# Patient Record
Sex: MALE | Race: WHITE | NOT HISPANIC OR LATINO | Employment: FULL TIME | URBAN - METROPOLITAN AREA
[De-identification: names, ages, dates, MRNs, and addresses within clinical notes are randomized per-mention and may not be internally consistent; named-entity substitution may affect disease eponyms.]

---

## 2017-03-25 ENCOUNTER — COMMUNICATION - HEALTHEAST (OUTPATIENT)
Dept: FAMILY MEDICINE | Facility: CLINIC | Age: 32
End: 2017-03-25

## 2017-03-25 DIAGNOSIS — F41.1 ANXIETY STATE: ICD-10-CM

## 2017-03-27 ENCOUNTER — COMMUNICATION - HEALTHEAST (OUTPATIENT)
Dept: FAMILY MEDICINE | Facility: CLINIC | Age: 32
End: 2017-03-27

## 2017-03-27 DIAGNOSIS — F41.1 ANXIETY STATE: ICD-10-CM

## 2017-04-22 ENCOUNTER — COMMUNICATION - HEALTHEAST (OUTPATIENT)
Dept: FAMILY MEDICINE | Facility: CLINIC | Age: 32
End: 2017-04-22

## 2017-04-22 DIAGNOSIS — F41.1 ANXIETY STATE: ICD-10-CM

## 2017-08-16 ENCOUNTER — OFFICE VISIT - HEALTHEAST (OUTPATIENT)
Dept: FAMILY MEDICINE | Facility: CLINIC | Age: 32
End: 2017-08-16

## 2017-08-16 DIAGNOSIS — F41.1 ANXIETY STATE: ICD-10-CM

## 2017-08-16 DIAGNOSIS — Z91.030 BEE STING ALLERGY: ICD-10-CM

## 2017-08-16 DIAGNOSIS — S76.302A LEFT HAMSTRING INJURY: ICD-10-CM

## 2017-08-16 ASSESSMENT — MIFFLIN-ST. JEOR: SCORE: 1718.5

## 2017-12-27 ENCOUNTER — COMMUNICATION - HEALTHEAST (OUTPATIENT)
Dept: FAMILY MEDICINE | Facility: CLINIC | Age: 32
End: 2017-12-27

## 2017-12-27 DIAGNOSIS — F41.1 ANXIETY STATE: ICD-10-CM

## 2018-01-11 ENCOUNTER — OFFICE VISIT - HEALTHEAST (OUTPATIENT)
Dept: FAMILY MEDICINE | Facility: CLINIC | Age: 33
End: 2018-01-11

## 2018-01-11 DIAGNOSIS — F41.1 ANXIETY STATE: ICD-10-CM

## 2018-01-11 DIAGNOSIS — L74.519 EXCESSIVE SWEATING, LOCAL: ICD-10-CM

## 2018-01-11 DIAGNOSIS — Z00.00 ROUTINE GENERAL MEDICAL EXAMINATION AT A HEALTH CARE FACILITY: ICD-10-CM

## 2018-01-11 DIAGNOSIS — E78.5 HYPERLIPIDEMIA: ICD-10-CM

## 2018-01-11 LAB
CHOLEST SERPL-MCNC: 212 MG/DL
FASTING STATUS PATIENT QL REPORTED: YES
FASTING STATUS PATIENT QL REPORTED: YES
GLUCOSE BLD-MCNC: 81 MG/DL (ref 70–99)
HDLC SERPL-MCNC: 39 MG/DL
LDLC SERPL CALC-MCNC: 153 MG/DL
TRIGL SERPL-MCNC: 100 MG/DL

## 2018-01-11 ASSESSMENT — MIFFLIN-ST. JEOR: SCORE: 1704.89

## 2018-03-01 ENCOUNTER — COMMUNICATION - HEALTHEAST (OUTPATIENT)
Dept: FAMILY MEDICINE | Facility: CLINIC | Age: 33
End: 2018-03-01

## 2018-03-01 DIAGNOSIS — R04.0 EPISTAXIS: ICD-10-CM

## 2018-03-26 ENCOUNTER — OFFICE VISIT - HEALTHEAST (OUTPATIENT)
Dept: FAMILY MEDICINE | Facility: CLINIC | Age: 33
End: 2018-03-26

## 2018-03-26 ENCOUNTER — COMMUNICATION - HEALTHEAST (OUTPATIENT)
Dept: SCHEDULING | Facility: CLINIC | Age: 33
End: 2018-03-26

## 2018-03-26 DIAGNOSIS — S01.81XA: ICD-10-CM

## 2018-03-26 DIAGNOSIS — S06.0X0A CONCUSSION WITHOUT LOSS OF CONSCIOUSNESS, INITIAL ENCOUNTER: ICD-10-CM

## 2018-03-26 ASSESSMENT — MIFFLIN-ST. JEOR: SCORE: 1723.49

## 2018-04-02 ENCOUNTER — OFFICE VISIT - HEALTHEAST (OUTPATIENT)
Dept: OTOLARYNGOLOGY | Facility: CLINIC | Age: 33
End: 2018-04-02

## 2018-04-02 DIAGNOSIS — D10.39 SQUAMOUS PAPILLOMA OF SOFT PALATE: ICD-10-CM

## 2018-04-05 LAB
LAB AP CHARGES (HE HISTORICAL CONVERSION): NORMAL
PATH REPORT.COMMENTS IMP SPEC: NORMAL
PATH REPORT.FINAL DX SPEC: NORMAL
PATH REPORT.GROSS SPEC: NORMAL
PATH REPORT.MICROSCOPIC SPEC OTHER STN: NORMAL
PATH REPORT.RELEVANT HX SPEC: NORMAL
RESULT FLAG (HE HISTORICAL CONVERSION): NORMAL

## 2018-06-17 ENCOUNTER — RECORDS - HEALTHEAST (OUTPATIENT)
Dept: ADMINISTRATIVE | Facility: OTHER | Age: 33
End: 2018-06-17

## 2018-06-24 ENCOUNTER — COMMUNICATION - HEALTHEAST (OUTPATIENT)
Dept: FAMILY MEDICINE | Facility: CLINIC | Age: 33
End: 2018-06-24

## 2018-06-24 DIAGNOSIS — F41.1 ANXIETY STATE: ICD-10-CM

## 2018-10-09 ENCOUNTER — COMMUNICATION - HEALTHEAST (OUTPATIENT)
Dept: FAMILY MEDICINE | Facility: CLINIC | Age: 33
End: 2018-10-09

## 2018-10-15 ENCOUNTER — RADIANT APPOINTMENT (OUTPATIENT)
Dept: GENERAL RADIOLOGY | Facility: CLINIC | Age: 33
End: 2018-10-15
Attending: OTOLARYNGOLOGY
Payer: COMMERCIAL

## 2018-10-15 ENCOUNTER — RECORDS - HEALTHEAST (OUTPATIENT)
Dept: ADMINISTRATIVE | Facility: OTHER | Age: 33
End: 2018-10-15

## 2018-10-15 ENCOUNTER — OFFICE VISIT (OUTPATIENT)
Dept: OTOLARYNGOLOGY | Facility: CLINIC | Age: 33
End: 2018-10-15
Payer: COMMERCIAL

## 2018-10-15 VITALS — WEIGHT: 179 LBS | SYSTOLIC BLOOD PRESSURE: 157 MMHG | TEMPERATURE: 98 F | DIASTOLIC BLOOD PRESSURE: 86 MMHG

## 2018-10-15 DIAGNOSIS — R04.2 HEMOPTYSIS: Primary | ICD-10-CM

## 2018-10-15 DIAGNOSIS — R04.2 HEMOPTYSIS: ICD-10-CM

## 2018-10-15 DIAGNOSIS — K13.79 BLOOD IN MOUTH OF UNKNOWN SOURCE: ICD-10-CM

## 2018-10-15 PROCEDURE — 71046 X-RAY EXAM CHEST 2 VIEWS: CPT | Mod: FY

## 2018-10-15 PROCEDURE — 99203 OFFICE O/P NEW LOW 30 MIN: CPT | Performed by: OTOLARYNGOLOGY

## 2018-10-15 RX ORDER — EPINEPHRINE 0.15 MG/.3ML
0.15 INJECTION INTRAMUSCULAR PRN
COMMUNITY

## 2018-10-15 ASSESSMENT — PAIN SCALES - GENERAL: PAINLEVEL: MILD PAIN (2)

## 2018-10-15 NOTE — LETTER
10/15/2018         RE: Cristi Ortega  [address] [facility] [address]        Dear Colleague,    Thank you for referring your patient, Cristi Ortega, to the [facility]. Please see a copy of my visit note below.        History of Present Illness - Cristi Ortega is a 33 year old male who presents with concern for intermittent spitting up blood. It occurs randomly for the past 9 months. He denies any associated coughing or epistaxis. He does smoke an e-cig, but hasnt done this for 1 month. He denies any associated throat pain. He has not undergone a CXR.    Past Medical History -   Excision of uvula mass    Current Medications -   Current Outpatient Prescriptions:      aluminum chloride (DRYSOL) 20 % external solution, Apply topically At Bedtime, Disp: , Rfl:      EPINEPHrine (EPIPEN JR) 0.15 MG/0.3ML injection 2-pack, Inject 0.15 mg into the muscle as needed for anaphylaxis, Disp: , Rfl:      sertraline (ZOLOFT) 50 MG tablet, Take 50 mg by mouth daily, Disp: , Rfl:     Allergies -   Allergies   Allergen Reactions     Bees Swelling       Social History -   Social History     Social History     Marital status:      Spouse name: N/A     Number of children: N/A     Years of education: N/A     Social History Main Topics     Smoking status: Not on file     Smokeless tobacco: Not on file     Alcohol use Not on file     Drug use: Not on file     Sexual activity: Not on file     Other Topics Concern     Not on file     Social History Narrative       Family History -   Family History   Problem Relation Age of Onset     Alzheimer Disease Father      early onset at 56     Alzheimer Disease Maternal Grandmother      Lung Cancer Maternal Grandfather      Alzheimer Disease Paternal Grandmother        Review of Systems - As per HPI and PMHx, otherwise 7 system review of the head and neck negative. 10+ system review negative.    Physical Exam  /86 (BP Location: Right arm, Patient Position: Chair,  Cuff Size: Adult Regular)  Temp 98  F (36.7  C) (Oral)  Wt 81.2 kg (179 lb)  General - The patient is well nourished and well developed, and appears to have good nutritional status.  Alert and oriented to person and place, answers questions and cooperates with examination appropriately.   Head and Face - Normocephalic and atraumatic, with no gross asymmetry noted of the contour of the facial features.  The facial nerve is intact, with strong symmetric movements.  Voice and Breathing - The patient was breathing comfortably without the use of accessory muscles. There was no wheezing, stridor, or stertor.  The patients voice was clear and strong, and had appropriate pitch and quality.  Ears - Bilateral pinna and EACs with normal appearing overlying skin. Tympanic membrane intact with good mobility on pneumatic otoscopy bilaterally. Bony landmarks of the ossicular chain are normal. The tympanic membranes are normal in appearance. No retraction, perforation, or masses.  No fluid or purulence was seen in the external canal or the middle ear.   Eyes - Extraocular movements intact.  Sclera were not icteric or injected, conjunctiva were pink and moist.  Mouth - Examination of the oral cavity showed pink, healthy oral mucosa. No lesions or ulcerations noted.  The tongue was mobile and midline, and the dentition were in good condition.    Throat - The walls of the oropharynx were smooth, pink, moist, symmetric, and had no lesions or ulcerations.  The tonsillar pillars and soft palate were symmetric.  The uvula was midline on elevation.  Neck - Normal midline excursion of the laryngotracheal complex during swallowing.  Full range of motion on passive movement.  Palpation of the occipital, submental, submandibular, internal jugular chain, and supraclavicular nodes did not demonstrate any abnormal lymph nodes or masses.  The carotid pulse was palpable bilaterally.  Palpation of the thyroid was soft and smooth, with no nodules  or goiter appreciated.  The trachea was mobile and midline.  Nose - External contour is symmetric, no gross deflection or scars.  Nasal mucosa is pink and moist with no abnormal mucus.  The septum was midline and non-obstructive, turbinates of normal size and position.  No polyps, masses, or purulence noted on examination.    Indirect laryngoscopy performed with laryngeal mirror demonstrating tongue base is Normal, epiglottis is Normal, the supraglottis is Normal, and the vocal folds are Normal.      Assessment - Cristi Ortega is a 33 year old male with intermittent blood in his saliva. I reassured him that the throat looks healthy today, and I do not see a clear source of bleeding. I warned him that he does have some periodontal disease, which could cause bleeding from the gums. I also ordered a CXR since this has been going on so long, although I would expect more blood with coughing if there is a pulmonary cause.       Dr. Mehrdad Rocha MD  Otolaryngology  [facility]        Again, thank you for allowing me to participate in the care of your patient.        Sincerely,        Mehrdad Rocha MD

## 2018-10-15 NOTE — PROGRESS NOTES
History of Present Illness - Cristi Ortega is a 33 year old male who presents with concern for intermittent spitting up blood. It occurs randomly for the past 9 months. He denies any associated coughing or epistaxis. He does smoke an e-cig, but hasnt done this for 1 month. He denies any associated throat pain. He has not undergone a CXR.    Past Medical History -   Excision of uvula mass    Current Medications -   Current Outpatient Prescriptions:      aluminum chloride (DRYSOL) 20 % external solution, Apply topically At Bedtime, Disp: , Rfl:      EPINEPHrine (EPIPEN JR) 0.15 MG/0.3ML injection 2-pack, Inject 0.15 mg into the muscle as needed for anaphylaxis, Disp: , Rfl:      sertraline (ZOLOFT) 50 MG tablet, Take 50 mg by mouth daily, Disp: , Rfl:     Allergies -   Allergies   Allergen Reactions     Bees Swelling       Social History -   Social History     Social History     Marital status:      Spouse name: N/A     Number of children: N/A     Years of education: N/A     Social History Main Topics     Smoking status: Not on file     Smokeless tobacco: Not on file     Alcohol use Not on file     Drug use: Not on file     Sexual activity: Not on file     Other Topics Concern     Not on file     Social History Narrative       Family History -   Family History   Problem Relation Age of Onset     Alzheimer Disease Father      early onset at 56     Alzheimer Disease Maternal Grandmother      Lung Cancer Maternal Grandfather      Alzheimer Disease Paternal Grandmother        Review of Systems - As per HPI and PMHx, otherwise 7 system review of the head and neck negative. 10+ system review negative.    Physical Exam  /86 (BP Location: Right arm, Patient Position: Chair, Cuff Size: Adult Regular)  Temp 98  F (36.7  C) (Oral)  Wt 81.2 kg (179 lb)  General - The patient is well nourished and well developed, and appears to have good nutritional status.  Alert and oriented to person and place, answers  questions and cooperates with examination appropriately.   Head and Face - Normocephalic and atraumatic, with no gross asymmetry noted of the contour of the facial features.  The facial nerve is intact, with strong symmetric movements.  Voice and Breathing - The patient was breathing comfortably without the use of accessory muscles. There was no wheezing, stridor, or stertor.  The patients voice was clear and strong, and had appropriate pitch and quality.  Ears - Bilateral pinna and EACs with normal appearing overlying skin. Tympanic membrane intact with good mobility on pneumatic otoscopy bilaterally. Bony landmarks of the ossicular chain are normal. The tympanic membranes are normal in appearance. No retraction, perforation, or masses.  No fluid or purulence was seen in the external canal or the middle ear.   Eyes - Extraocular movements intact.  Sclera were not icteric or injected, conjunctiva were pink and moist.  Mouth - Examination of the oral cavity showed pink, healthy oral mucosa. No lesions or ulcerations noted.  The tongue was mobile and midline, and the dentition were in good condition.    Throat - The walls of the oropharynx were smooth, pink, moist, symmetric, and had no lesions or ulcerations.  The tonsillar pillars and soft palate were symmetric.  The uvula was midline on elevation.  Neck - Normal midline excursion of the laryngotracheal complex during swallowing.  Full range of motion on passive movement.  Palpation of the occipital, submental, submandibular, internal jugular chain, and supraclavicular nodes did not demonstrate any abnormal lymph nodes or masses.  The carotid pulse was palpable bilaterally.  Palpation of the thyroid was soft and smooth, with no nodules or goiter appreciated.  The trachea was mobile and midline.  Nose - External contour is symmetric, no gross deflection or scars.  Nasal mucosa is pink and moist with no abnormal mucus.  The septum was midline and non-obstructive,  turbinates of normal size and position.  No polyps, masses, or purulence noted on examination.    Indirect laryngoscopy performed with laryngeal mirror demonstrating tongue base is Normal, epiglottis is Normal, the supraglottis is Normal, and the vocal folds are Normal.      Assessment - Cristi Ortega is a 33 year old male with intermittent blood in his saliva. I reassured him that the throat looks healthy today, and I do not see a clear source of bleeding. I warned him that he does have some periodontal disease, which could cause bleeding from the gums. I also ordered a CXR since this has been going on so long, although I would expect more blood with coughing if there is a pulmonary cause.       Dr. Mehrdad Rocha MD  Otolaryngology  [facility]

## 2018-10-15 NOTE — NURSING NOTE
Initial /86 (BP Location: Right arm, Patient Position: Chair, Cuff Size: Adult Regular)  Temp 98  F (36.7  C) (Oral)  Wt 81.2 kg (179 lb) There is no height or weight on file to calculate BMI. .    Kati Saldana LPN

## 2018-10-15 NOTE — MR AVS SNAPSHOT
"              After Visit Summary   10/15/2018    Cristi Ortega    MRN: [id number]           Patient Information     Date Of Birth          1985        Visit Information        Provider Department      10/15/2018 3:00 PM Mehrdad Rocha MD [facility]        Today's Diagnoses     Hemoptysis    -  1    Blood in mouth of unknown source          Care Instructions    Per physician's instructions            Follow-ups after your visit        Future tests that were ordered for you today     Open Future Orders        Priority Expected Expires Ordered    XR Chest 2 Views Routine 10/15/2018 10/15/2019 10/15/2018            Who to contact     If you have questions or need follow up information about today's clinic visit or your schedule please contact [facility] directly at [phone].  Normal or non-critical lab and imaging results will be communicated to you by MyChart, letter or phone within 4 business days after the clinic has received the results. If you do not hear from us within 7 days, please contact the clinic through MyChart or phone. If you have a critical or abnormal lab result, we will notify you by phone as soon as possible.  Submit refill requests through [employer] or call your pharmacy and they will forward the refill request to us. Please allow 3 business days for your refill to be completed.          Additional Information About Your Visit        My[employer] Information     [employer] lets you send messages to your doctor, view your test results, renew your prescriptions, schedule appointments and more. To sign up, go to [email or web address] . Click on \"Log in\" on the left side of the screen, which will take you to the Welcome page. Then click on \"Sign up Now\" on the right side of the page.     You will be asked to enter the access code listed below, as well as some personal information. Please follow the directions to create your username and password.     Your access code is: " [email or web address]  Expires: 2019  3:27 PM     Your access code will  in 90 days. If you need help or a new code, please call your [facility] clinic or [phone].        Care EveryWhere ID     This is your Care EveryWhere ID. This could be used by other organizations to access your [facility] medical records  [id number]        Your Vitals Were     Temperature                   98  F (36.7  C) (Oral)            Blood Pressure from Last 3 Encounters:   10/15/18 157/86    Weight from Last 3 Encounters:   10/15/18 81.2 kg (179 lb)               Primary Care Provider Office Phone # Fax #    Seth Duvall [phone]       [facility] [id number] [place] [id number]        Equal Access to Services     ZAK JASON : Hadii aad ku hadasho Soomaali, waaxda luqadaha, qaybta kaalmada adeegyada, waxay idiin hayaan efren khjose guadalupe whelan . So [facility] [phone].    ATENCIÓN: Si habla español, tiene a west disposición servicios gratuitos de asistencia lingüística. [place] al [phone].    We comply with applicable federal civil rights laws and [place] laws. We do not discriminate on the basis of race, color, national origin, age, disability, sex, sexual orientation, or gender identity.            Thank you!     Thank you for choosing [facility]  for your care. Our goal is always to provide you with excellent care. Hearing back from our patients is one way we can continue to improve our services. Please take a few minutes to complete the written survey that you may receive in the mail after your visit with us. Thank you!             Your Updated Medication List - Protect others around you: Learn how to safely use, store and throw away your medicines at www.disposemymeds.org.          This list is accurate as of 10/15/18  3:27 PM.  Always use your most recent med list.                   Brand Name Dispense Instructions for use Diagnosis    aluminum chloride 20 % external solution     DRYSOL     Apply topically At Bedtime        EPINEPHrine 0.15 MG/0.3ML injection 2-pack    EPIPEN JR     Inject 0.15 mg into the muscle as needed for anaphylaxis        ZOLOFT 50 MG tablet   Generic drug:  sertraline      Take 50 mg by mouth daily

## 2018-12-12 ENCOUNTER — COMMUNICATION - HEALTHEAST (OUTPATIENT)
Dept: FAMILY MEDICINE | Facility: CLINIC | Age: 33
End: 2018-12-12

## 2018-12-12 DIAGNOSIS — F41.1 ANXIETY STATE: ICD-10-CM

## 2019-01-08 ENCOUNTER — RECORDS - HEALTHEAST (OUTPATIENT)
Dept: GENERAL RADIOLOGY | Facility: CLINIC | Age: 34
End: 2019-01-08

## 2019-01-08 ENCOUNTER — OFFICE VISIT - HEALTHEAST (OUTPATIENT)
Dept: FAMILY MEDICINE | Facility: CLINIC | Age: 34
End: 2019-01-08

## 2019-01-08 DIAGNOSIS — R19.5 LOOSE STOOLS: ICD-10-CM

## 2019-01-08 DIAGNOSIS — R14.0 ABDOMINAL DISTENSION (GASEOUS): ICD-10-CM

## 2019-01-08 DIAGNOSIS — R14.0 ABDOMINAL BLOATING: ICD-10-CM

## 2019-01-08 LAB
ALBUMIN SERPL-MCNC: 4.6 G/DL (ref 3.5–5)
ALP SERPL-CCNC: 51 U/L (ref 45–120)
ALT SERPL W P-5'-P-CCNC: 16 U/L (ref 0–45)
ANION GAP SERPL CALCULATED.3IONS-SCNC: 12 MMOL/L (ref 5–18)
AST SERPL W P-5'-P-CCNC: 17 U/L (ref 0–40)
BILIRUB SERPL-MCNC: 0.7 MG/DL (ref 0–1)
BUN SERPL-MCNC: 14 MG/DL (ref 8–22)
CALCIUM SERPL-MCNC: 9.6 MG/DL (ref 8.5–10.5)
CHLORIDE BLD-SCNC: 105 MMOL/L (ref 98–107)
CO2 SERPL-SCNC: 23 MMOL/L (ref 22–31)
CREAT SERPL-MCNC: 1.02 MG/DL (ref 0.7–1.3)
ERYTHROCYTE [DISTWIDTH] IN BLOOD BY AUTOMATED COUNT: 12.1 % (ref 11–14.5)
GFR SERPL CREATININE-BSD FRML MDRD: >60 ML/MIN/1.73M2
GLUCOSE BLD-MCNC: 83 MG/DL (ref 70–125)
HCT VFR BLD AUTO: 44.6 % (ref 40–54)
HGB BLD-MCNC: 15.5 G/DL (ref 14–18)
MCH RBC QN AUTO: 30.1 PG (ref 27–34)
MCHC RBC AUTO-ENTMCNC: 34.8 G/DL (ref 32–36)
MCV RBC AUTO: 87 FL (ref 80–100)
PLATELET # BLD AUTO: 310 THOU/UL (ref 140–440)
PMV BLD AUTO: 7.8 FL (ref 7–10)
POTASSIUM BLD-SCNC: 3.8 MMOL/L (ref 3.5–5)
PROT SERPL-MCNC: 7.5 G/DL (ref 6–8)
RBC # BLD AUTO: 5.15 MILL/UL (ref 4.4–6.2)
SODIUM SERPL-SCNC: 140 MMOL/L (ref 136–145)
TSH SERPL DL<=0.005 MIU/L-ACNC: 2.62 UIU/ML (ref 0.3–5)
WBC: 10 THOU/UL (ref 4–11)

## 2019-01-09 ENCOUNTER — AMBULATORY - HEALTHEAST (OUTPATIENT)
Dept: LAB | Facility: CLINIC | Age: 34
End: 2019-01-09

## 2019-01-09 DIAGNOSIS — R19.5 LOOSE STOOLS: ICD-10-CM

## 2019-01-09 LAB
C DIFF TOX B STL QL: NEGATIVE
RIBOTYPE 027/NAP1/BI: NORMAL

## 2019-01-10 LAB
C PARVUM AG STL QL IA: NORMAL
G LAMBLIA AG STL QL IA: NORMAL

## 2019-01-11 ENCOUNTER — AMBULATORY - HEALTHEAST (OUTPATIENT)
Dept: LAB | Facility: CLINIC | Age: 34
End: 2019-01-11

## 2019-01-11 DIAGNOSIS — R19.5 LOOSE STOOLS: ICD-10-CM

## 2019-01-12 LAB — BACTERIA SPEC CULT: NORMAL

## 2019-01-14 ENCOUNTER — OFFICE VISIT - HEALTHEAST (OUTPATIENT)
Dept: FAMILY MEDICINE | Facility: CLINIC | Age: 34
End: 2019-01-14

## 2019-01-14 DIAGNOSIS — E78.5 HYPERLIPIDEMIA, UNSPECIFIED HYPERLIPIDEMIA TYPE: ICD-10-CM

## 2019-01-14 DIAGNOSIS — R19.5 CHANGE IN CONSISTENCY OF STOOL: ICD-10-CM

## 2019-01-14 DIAGNOSIS — R14.0 BLOATING: ICD-10-CM

## 2019-01-14 DIAGNOSIS — F41.1 ANXIETY STATE: ICD-10-CM

## 2019-01-14 DIAGNOSIS — Z00.00 ROUTINE GENERAL MEDICAL EXAMINATION AT A HEALTH CARE FACILITY: ICD-10-CM

## 2019-01-14 LAB
O+P STL MICRO: NORMAL

## 2019-01-14 ASSESSMENT — MIFFLIN-ST. JEOR: SCORE: 1752.97

## 2019-01-30 ENCOUNTER — COMMUNICATION - HEALTHEAST (OUTPATIENT)
Dept: FAMILY MEDICINE | Facility: CLINIC | Age: 34
End: 2019-01-30

## 2019-01-30 DIAGNOSIS — R19.5 CHANGE IN STOOL: ICD-10-CM

## 2019-02-15 ENCOUNTER — RECORDS - HEALTHEAST (OUTPATIENT)
Dept: ADMINISTRATIVE | Facility: OTHER | Age: 34
End: 2019-02-15

## 2019-03-12 ENCOUNTER — COMMUNICATION - HEALTHEAST (OUTPATIENT)
Dept: FAMILY MEDICINE | Facility: CLINIC | Age: 34
End: 2019-03-12

## 2019-03-12 DIAGNOSIS — F41.1 ANXIETY STATE: ICD-10-CM

## 2019-05-20 ENCOUNTER — OFFICE VISIT - HEALTHEAST (OUTPATIENT)
Dept: FAMILY MEDICINE | Facility: CLINIC | Age: 34
End: 2019-05-20

## 2019-05-20 DIAGNOSIS — H01.009 BLEPHARITIS, UNSPECIFIED LATERALITY, UNSPECIFIED TYPE: ICD-10-CM

## 2019-05-20 DIAGNOSIS — H00.014 HORDEOLUM EXTERNUM OF LEFT UPPER EYELID: ICD-10-CM

## 2019-09-26 ENCOUNTER — COMMUNICATION - HEALTHEAST (OUTPATIENT)
Dept: SCHEDULING | Facility: CLINIC | Age: 34
End: 2019-09-26

## 2019-09-26 ENCOUNTER — OFFICE VISIT - HEALTHEAST (OUTPATIENT)
Dept: FAMILY MEDICINE | Facility: CLINIC | Age: 34
End: 2019-09-26

## 2019-09-26 DIAGNOSIS — T63.441A BEE STING REACTION, ACCIDENTAL OR UNINTENTIONAL, INITIAL ENCOUNTER: ICD-10-CM

## 2020-02-12 ENCOUNTER — COMMUNICATION - HEALTHEAST (OUTPATIENT)
Dept: FAMILY MEDICINE | Facility: CLINIC | Age: 35
End: 2020-02-12

## 2020-02-12 DIAGNOSIS — F41.1 ANXIETY STATE: ICD-10-CM

## 2020-03-18 ENCOUNTER — COMMUNICATION - HEALTHEAST (OUTPATIENT)
Dept: FAMILY MEDICINE | Facility: CLINIC | Age: 35
End: 2020-03-18

## 2020-03-31 ENCOUNTER — COMMUNICATION - HEALTHEAST (OUTPATIENT)
Dept: FAMILY MEDICINE | Facility: CLINIC | Age: 35
End: 2020-03-31

## 2020-03-31 DIAGNOSIS — F41.1 ANXIETY STATE: ICD-10-CM

## 2020-05-08 ENCOUNTER — VIRTUAL VISIT (OUTPATIENT)
Dept: FAMILY MEDICINE | Facility: OTHER | Age: 35
End: 2020-05-08

## 2020-05-08 NOTE — PROGRESS NOTES
"Date: 2020 13:40:25  Clinician: Renita German  Clinician NPI: [id number]  Patient: Cristi Ortega  Patient : 1985  Patient Address: [address] [place] [address]  Patient Phone: [phone]  Visit Protocol: URI  Patient Summary:  Cristi is a 35 year old ( : 1985 ) male who initiated a Visit for cold, sinus infection, or influenza. When asked the question \"Please sign me up to receive news, health information and promotions from [employer].\", Cristi responded \"No\".    Cristi states his symptoms started gradually 10-13 days ago. After his symptoms started, they improved and then got worse again.   His symptoms consist of a sore throat and a cough.   Symptom details     Cough: Cristi coughs a few times an hour and his cough is not more bothersome at night. Phlegm comes into his throat when he coughs. He believes his cough is caused by post-nasal drip. The color of the phlegm is clear.     Sore throat: Cristi reports having mild throat pain (1-3 on a 10 point pain scale), does not have exudate on his tonsils, and can swallow liquids. He is not sure if the lymph nodes in his neck are enlarged. A rash has not appeared on the skin since the sore throat started.      Cristi denies having fever, myalgias, rhinitis, facial pain or pressure, nasal congestion, vomiting, nausea, teeth pain, ageusia, anosmia, diarrhea, ear pain, headache, malaise, wheezing, and chills. He also denies taking antibiotic medication for the symptoms and having recent facial or sinus surgery in the past 60 days. He is not experiencing dyspnea.   Precipitating events  Within the past week, Cristi has not been exposed to someone with strep throat. He has not recently been exposed to someone with influenza. Cristi has been in close contact with the following high risk individuals: children under the age of 5.   Pertinent COVID-19 (Coronavirus) information  Cristi does not work or volunteer as healthcare worker or a first " responder and does not work or volunteer in a healthcare facility.   He does not live with a healthcare worker.   Cristi has not had a close contact with a laboratory-confirmed COVID-19 patient within 14 days of symptom onset. He also has not had a close contact with a suspected COVID-19 patient within 14 days of symptom onset.   Pertinent medical history  Cristi does not need a return to work/school note.   Weight: 175 lbs   Cristi does not smoke or use smokeless tobacco.   Weight: 175 lbs    MEDICATIONS: sertraline oral, ALLERGIES: NKDA  Clinician Response:  Dear Cristi,    Dear Cristi  Your symptoms show that you may have coronavirus (COVID-19). This illness can cause fever, cough and trouble breathing. Many people get a mild case and get better on their own. Some people can get very sick.   Will I be tested for COVID-19?  Because we have limited testing supplies, we do not test everyone who is at low risk. We are testing if:    You are very ill. For example, you're on chemotherapy, dialysis or home hospice care. (Contact your specialty clinic or program.)   You live in a nursing home or other long-term care facility. (Talk to your nurse manager or medical director.)   You're a health care worker. ([facility] employees contact our employee health office for testing.)   We are performing limited curbside testing for healthcare/first responders and people with medical problems that put them at increased risk. It does not appear by the [facility] information you submitted that you meet any of these criteria. If there are medical problems that we did not know about, please repeat an [facility] visit and let us know what medical conditions you have.   How can I protect others?  Without a test, we can't know for sure that you have COVID-19. For safety, it's very important to follow these rules.  First, stay home and away from others (self-isolate) until:   You've had no fever---and no medicine that reduces fever---for 3 full  days (72 hours). And...    Your other symptoms have gotten better. For example, your cough or breathing has improved. And...   At least 10 days have passed since your symptoms started.   During this time:   Don't go to work, school or anywhere else.    Stay away from others in your home. No hugging, kissing or shaking hands.   Don't let anyone visit.   Cover your mouth and nose with a mask, tissue or wash cloth to avoid spreading germs.   Wash your hands and face often. Use soap and water.   How can I take care of myself?   1.Take Tylenol (acetaminophen) for fever or pain. If you have liver or kidney problems, ask your family doctor if it's okay to take Tylenol.        Adults can take either:    650 mg (two 325 mg pills) every 4 to 6 hours, or...   1,000 mg (two 500 mg pills) every 8 hours as needed.    Note: Don't take more than 3,000 mg in one day.   For children, check the Tylenol bottle for the right dose. The dose is based on the child's age or weight.   2.If you have other health problems (like cancer, heart failure, an organ transplant or severe kidney disease): Call your specialty clinic if you don't feel better in the next 2 days.       3.Know when to call 911: If your breathing is so bad that it keeps you from doing normal activities, call 911 or go to the emergency room. Tell them that you've been staying home and may have COVID-19.   Where can I get more information?  To learn more about COVID-19 and how to care for yourself at home, please visit the CDC website at https://www.cdc.gov/coronavirus/2019-ncov/about/steps-when-sick.html.  For more about your care at [facility], please visit [email or web address]   If you are interested in becoming part of a [facility] clinic trial related to COVID19 please go to [email or web address] for information, if you qualify.       Diagnosis: Cough  Diagnosis ICD: R05

## 2020-05-18 ENCOUNTER — COMMUNICATION - HEALTHEAST (OUTPATIENT)
Dept: FAMILY MEDICINE | Facility: CLINIC | Age: 35
End: 2020-05-18

## 2020-05-18 DIAGNOSIS — F41.1 ANXIETY STATE: ICD-10-CM

## 2020-06-19 ENCOUNTER — COMMUNICATION - HEALTHEAST (OUTPATIENT)
Dept: FAMILY MEDICINE | Facility: CLINIC | Age: 35
End: 2020-06-19

## 2020-06-19 DIAGNOSIS — F41.1 ANXIETY STATE: ICD-10-CM

## 2020-10-21 ENCOUNTER — OFFICE VISIT - HEALTHEAST (OUTPATIENT)
Dept: FAMILY MEDICINE | Facility: CLINIC | Age: 35
End: 2020-10-21

## 2020-10-21 DIAGNOSIS — M54.50 ACUTE MIDLINE LOW BACK PAIN WITHOUT SCIATICA: ICD-10-CM

## 2020-10-21 DIAGNOSIS — E78.5 HYPERLIPIDEMIA, UNSPECIFIED HYPERLIPIDEMIA TYPE: ICD-10-CM

## 2020-10-21 DIAGNOSIS — F41.1 ANXIETY STATE: ICD-10-CM

## 2020-10-21 DIAGNOSIS — Z00.00 ROUTINE GENERAL MEDICAL EXAMINATION AT A HEALTH CARE FACILITY: ICD-10-CM

## 2020-10-21 LAB
ANION GAP SERPL CALCULATED.3IONS-SCNC: 10 MMOL/L (ref 5–18)
BUN SERPL-MCNC: 12 MG/DL (ref 8–22)
CALCIUM SERPL-MCNC: 9.7 MG/DL (ref 8.5–10.5)
CHLORIDE BLD-SCNC: 103 MMOL/L (ref 98–107)
CHOLEST SERPL-MCNC: 235 MG/DL
CO2 SERPL-SCNC: 27 MMOL/L (ref 22–31)
CREAT SERPL-MCNC: 0.84 MG/DL (ref 0.7–1.3)
FASTING STATUS PATIENT QL REPORTED: YES
GFR SERPL CREATININE-BSD FRML MDRD: >60 ML/MIN/1.73M2
GLUCOSE BLD-MCNC: 86 MG/DL (ref 70–125)
HDLC SERPL-MCNC: 40 MG/DL
LDLC SERPL CALC-MCNC: 171 MG/DL
POTASSIUM BLD-SCNC: 4.1 MMOL/L (ref 3.5–5)
SODIUM SERPL-SCNC: 140 MMOL/L (ref 136–145)
TRIGL SERPL-MCNC: 120 MG/DL

## 2020-10-21 ASSESSMENT — MIFFLIN-ST. JEOR: SCORE: 1695.25

## 2020-12-17 ENCOUNTER — COMMUNICATION - HEALTHEAST (OUTPATIENT)
Dept: FAMILY MEDICINE | Facility: CLINIC | Age: 35
End: 2020-12-17

## 2020-12-18 ENCOUNTER — COMMUNICATION - HEALTHEAST (OUTPATIENT)
Dept: SCHEDULING | Facility: CLINIC | Age: 35
End: 2020-12-18

## 2021-05-28 NOTE — PATIENT INSTRUCTIONS - HE
Use the antibiotic drops in the left eye which has the discharge.    You can consider use in the right eye as well  Apply warm compresses as needed  You can use baby shampoo and scrub the eyelids as discussed   Follow-up with ophthalmology if not improving

## 2021-05-29 NOTE — PROGRESS NOTES
Assessment/ Plan     1. Hordeolum externum of left upper eyelid    Recommend application of warm compresses  Treat with Polytrim drops  If not improving then refer to ophthalmology  - polymyxin B-trimethoprim (FOR POLYTRIM) 10,000 unit- 1 mg/mL Drop ophthalmic drops; Instill one drop in the left eye three times a day x 10 days  Dispense: 10 mL; Refill: 0  - Ambulatory referral to Ophthalmology    2. Blepharitis, unspecified laterality, unspecified type    Recommend treatment with frequent lid scrubs with baby shampoo  - polymyxin B-trimethoprim (FOR POLYTRIM) 10,000 unit- 1 mg/mL Drop ophthalmic drops; Instill one drop in the left eye three times a day x 10 days  Dispense: 10 mL; Refill: 0  - Ambulatory referral to Ophthalmology    Patient understands he will follow-up with ophthalmology if not improving      Subjective:       Cristi Ortega is a 34 y.o. male who presents to the clinic regarding concerns for his eyes.  He has developed a small lump involving his right lower eyelid.  This is been present for several weeks and has improved in size.  He now has a lump involving the left upper eyelid.  He does note that he has some drainage from his left eye at times.  He has had some discomfort.  Denies obvious injury.  He denies visual distortion.  His eyes can feel irritated at times.  He does not believe that he has significant allergies.    The following portions of the patient's history were reviewed and updated as appropriate: allergies, current medications, past family history, past medical history, past social history, past surgical history and problem list. Medications have been reconciled    Review of Systems   A 12 point comprehensive review of systems was negative except as noted.      Current Outpatient Medications   Medication Sig Dispense Refill     aluminum chloride (DRYSOL) 20 % external solution Apply topically 2 (two) times a day. 60 mL 6     EPINEPHrine (EPIPEN 2-DALE) 0.3 mg/0.3 mL atIn INJECT 0.3ML  INTO THE SHOULDER, THIGH OR BUTTOCKS ONCE 2 Pre-filled Pen Syringe 1     sertraline (ZOLOFT) 50 MG tablet TAKE 1 TABLET DAILY 90 tablet 2     polymyxin B-trimethoprim (FOR POLYTRIM) 10,000 unit- 1 mg/mL Drop ophthalmic drops Instill one drop in the left eye three times a day x 10 days 10 mL 0     No current facility-administered medications for this visit.        Objective:      /82   Pulse 72   Wt 181 lb (82.1 kg)   BMI 26.35 kg/m        General appearance: alert, appears stated age and cooperative  Head: Normocephalic, without obvious abnormality, atraumatic  Eyes: Examination of the left upper eyelid reveals a small stye  There is scaliness of his eyelids in general  Examination of the right lower eyelid small palpable lump  Ears: normal TM's and external ear canals both ears  Neurologic: Alert and oriented X 3         No results found for this or any previous visit (from the past 168 hour(s)).       This note has been dictated using voice recognition software. Any grammatical or context distortions are unintentional and inherent to the software

## 2021-05-31 VITALS — WEIGHT: 172 LBS | HEIGHT: 70 IN | BODY MASS INDEX: 24.62 KG/M2

## 2021-05-31 VITALS — HEIGHT: 70 IN | BODY MASS INDEX: 24.2 KG/M2 | WEIGHT: 169 LBS

## 2021-06-01 VITALS — HEIGHT: 70 IN | BODY MASS INDEX: 24.78 KG/M2 | WEIGHT: 173.1 LBS

## 2021-06-01 NOTE — TELEPHONE ENCOUNTER
Bee sting 2 days ago.    Below buttocks on upper leg. Right.    Wasp bite is what patient states.    Red, inflamed, hot, swollen itchy.    So far he has done ice, and hydrocortisone.    Not helping he states, and he would like to be seen.    Offered appointment  Today, but declined due to new born infant appointment.    Patient advised to go to [facility]. Today. He agreed with POC.    Gisela Callahan RN  [facility] Triage/refill nurse

## 2021-06-02 VITALS — WEIGHT: 179.6 LBS | BODY MASS INDEX: 25.71 KG/M2 | HEIGHT: 70 IN

## 2021-06-02 VITALS — BODY MASS INDEX: 26.42 KG/M2 | WEIGHT: 181.5 LBS

## 2021-06-03 VITALS — WEIGHT: 181 LBS | BODY MASS INDEX: 26.35 KG/M2

## 2021-06-03 VITALS
WEIGHT: 172.13 LBS | OXYGEN SATURATION: 100 % | HEART RATE: 81 BPM | SYSTOLIC BLOOD PRESSURE: 129 MMHG | DIASTOLIC BLOOD PRESSURE: 81 MMHG | TEMPERATURE: 98.1 F | BODY MASS INDEX: 25.05 KG/M2 | RESPIRATION RATE: 16 BRPM

## 2021-06-05 VITALS
TEMPERATURE: 98.7 F | RESPIRATION RATE: 18 BRPM | HEART RATE: 85 BPM | DIASTOLIC BLOOD PRESSURE: 86 MMHG | BODY MASS INDEX: 23.77 KG/M2 | WEIGHT: 166 LBS | HEIGHT: 70 IN | SYSTOLIC BLOOD PRESSURE: 126 MMHG

## 2021-06-06 NOTE — TELEPHONE ENCOUNTER
Refill Request  Did you contact pharmacy: n/a  Medication name:   Requested Prescriptions     Pending Prescriptions Disp Refills     sertraline (ZOLOFT) 50 MG tablet 90 tablet 0     Sig: TAKE 1 TABLET DAILY     Who prescribed the medication: Seth See MD  Requested Pharmacy: [facility]  Is patient out of medication: Yes  Patient notified refills processed in 3 business days:  yes  Okay to leave a detailed message: yes

## 2021-06-06 NOTE — TELEPHONE ENCOUNTER
Refill Approved    Rx renewed per Medication Renewal Policy. Medication was last renewed on 3/15/19.    Ov: 5/20/19    Karmen Shipman, [facility] Triage/Med Refill 2/12/2020     Requested Prescriptions   Pending Prescriptions Disp Refills     sertraline (ZOLOFT) 50 MG tablet 90 tablet 0     Sig: TAKE 1 TABLET DAILY       SSRI Refill Protocol  Passed - 2/12/2020  2:08 PM        Passed - PCP or prescribing provider visit in last year     Last office visit with prescriber/PCP: 5/20/2019 Seth See MD OR same dept: 5/20/2019 Seth See MD OR same specialty: 5/20/2019 Seth See MD  Last physical: 1/14/2019 Last MTM visit: Visit date not found   Next visit within 3 mo: Visit date not found  Next physical within 3 mo: Visit date not found  Prescriber OR PCP: Seth See MD  Last diagnosis associated with med order: 1. Anxiety  - sertraline (ZOLOFT) 50 MG tablet; TAKE 1 TABLET DAILY  Dispense: 90 tablet; Refill: 0    If protocol passes may refill for 12 months if within 3 months of last provider visit (or a total of 15 months).

## 2021-06-08 NOTE — TELEPHONE ENCOUNTER
Refill Approved    Rx renewed per Medication Renewal Policy. Medication was last renewed on 20.    Khadijah Plummer, [facility] Connection Triage/Med Refill 2020     Requested Prescriptions   Pending Prescriptions Disp Refills     sertraline (ZOLOFT) 50 MG tablet 90 tablet 0     Si tablet (50 mg total) daily.       SSRI Refill Protocol  Passed - 2020  1:25 PM        Passed - PCP or prescribing provider visit in last year     Last office visit with prescriber/PCP: 2019 Seth See MD OR same dept: 2019 Seth See MD OR same specialty: 2019 Seth See MD  Last physical: 2019 Last MTM visit: Visit date not found   Next visit within 3 mo: Visit date not found  Next physical within 3 mo: Visit date not found  Prescriber OR PCP: Seth See MD  Last diagnosis associated with med order: 1. Anxiety  - sertraline (ZOLOFT) 50 MG tablet; 1 tablet (50 mg total) daily.  Dispense: 90 tablet; Refill: 0    If protocol passes may refill for 12 months if within 3 months of last provider visit (or a total of 15 months).

## 2021-06-09 NOTE — TELEPHONE ENCOUNTER
RN cannot approve Refill Request    RN can NOT refill this medication Protocol failed and NO refill given.     Khadijah Plummer, [facility] Triage/Med Refill 6/19/2020    Requested Prescriptions   Pending Prescriptions Disp Refills     sertraline (ZOLOFT) 50 MG tablet [Pharmacy Med Name: SERTRALINE TAB 50MG] 90 tablet 0     Sig: TAKE 1 TABLET DAILY       SSRI Refill Protocol  Failed - 6/19/2020  4:35 AM        Failed - PCP or prescribing provider visit in last year     Last office visit with prescriber/PCP: 5/20/2019 Seth See MD OR same dept: Visit date not found OR same specialty: 5/20/2019 Seth See MD  Last physical: 1/14/2019 Last MTM visit: Visit date not found   Next visit within 3 mo: Visit date not found  Next physical within 3 mo: Visit date not found  Prescriber OR PCP: Seth See MD  Last diagnosis associated with med order: 1. Anxiety  - sertraline (ZOLOFT) 50 MG tablet [Pharmacy Med Name: SERTRALINE TAB 50MG]; TAKE 1 TABLET DAILY  Dispense: 90 tablet; Refill: 0    If protocol passes may refill for 12 months if within 3 months of last provider visit (or a total of 15 months).

## 2021-06-12 NOTE — PATIENT INSTRUCTIONS - HE
Remain physically active (your goal is 30 minutes of aerobic exercise most days)  You received the flu shot today  I sent sertraline to your pharmacy
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
## 2021-06-12 NOTE — PROGRESS NOTES
Assessment/ Plan     1. Left hamstring injury    Recommend systematic treatment with rest   He may apply cool packs or heat as needed  Recommend follow-up if not improving.  Consider referral to orthopedics.  Could consider imaging if his pain is severe    2. Anxiety    Continue sertraline 50 mg daily as this has been effective for him  Reviewed potential side effects  - sertraline (ZOLOFT) 50 MG tablet; TAKE 1 TABLET DAILY  Dispense: 90 tablet; Refill: 3    3. Bee sting allergy    Refilled his EpiPen    25 minutes were spent with the patient and greater than 50% of the time was spent in face to face counseling and coordination of care        Subjective:       Cristi Ortega is a 32 y.o. male who presents to the clinic for 2 primary concerns.  First, he injured his left leg recently.  He states that he was playing goalie at hockey.  He pulled the area and has had pain behind his left leg.  He has applied ice and tried compression.  He continues to walk with a limp.  He states that every 10th step feels a pulling type of sensation.  Next, he has a history of anxiety.  He has some elements of social anxiety and his medication has been quite effective.  He prefers not to wean off of this.  His mood has been stable.  Next, his history of hyperlipidemia.  His total cholesterol is 235 and LDL is 171 he has been advised to consider recheck in the future.  Finally, he needs a refill of an EpiPen I given that he has had significant reactions to bee stings in the past.    The following portions of the patient's history were reviewed and updated as appropriate: allergies, current medications, past family history, past medical history, past social history, past surgical history and problem list.    Review of Systems   A 12 point comprehensive review of systems was negative except as noted.      Current Outpatient Prescriptions   Medication Sig Dispense Refill     EPINEPHrine (EPIPEN 2-DALE) 0.3 mg/0.3 mL atIn INJECT 0.3ML INTO  "THE SHOULDER, THIGH OR BUTTOCKS ONCE 2 Pre-filled Pen Syringe 1     sertraline (ZOLOFT) 50 MG tablet TAKE 1 TABLET DAILY 30 tablet 0     sertraline (ZOLOFT) 50 MG tablet TAKE 1 TABLET DAILY 90 tablet 3     No current facility-administered medications for this visit.        Objective:      /76 (Patient Site: Right Arm, Patient Position: Sitting, Cuff Size: Adult Regular)  Pulse 84  Temp 98.5  F (36.9  C) (Oral)   Resp 16  Ht 5' 9.5\" (1.765 m)  Wt 172 lb (78 kg)  BMI 25.04 kg/m2      General appearance: alert, appears stated age and cooperative  Head: Normocephalic, without obvious abnormality, atraumatic  Extremities: extremities normal, atraumatic, no cyanosis or edema  Examination of the left leg reveals some point tenderness in the proximal insertion of the hamstrings and gluteal area  There is no obvious palpable abnormality.  There is no ecchymosis  Skin: Skin color, texture, turgor normal. No rashes or lesions  Lymph nodes: Cervical, supraclavicular, and axillary nodes normal.  Neurologic: Alert and oriented X 3.         No results found for this or any previous visit (from the past 168 hour(s)).       This note has been dictated using voice recognition software. Any grammatical or context distortions are unintentional and inherent to the software  "

## 2021-06-15 NOTE — PROGRESS NOTES
Assessment/ Plan     1. Routine general medical examination at a health care facility    Recommend increasing physical activity  Check a lipid cascade and glucose  His tetanus vaccine status is up-to-date    Recommend follow-up if having ongoing blood-tinged mucus.  Would favor follow-up with ENT initially    2. Anxiety    Continue sertraline 50 mg daily  He has attempted weaning in the past and would prefer to stay on this medication at current dose  - sertraline (ZOLOFT) 50 MG tablet; TAKE 1 TABLET DAILY  Dispense: 90 tablet; Refill: 3    3. Hyperlipidemia    Recommend improving diet and exercise  - Lipid Cascade  - Glucose    4. Excessive sweating, local    Prescribed Drysol to use as needed      Subjective:       Cristi Ortega is a 32 y.o. male who presents to the clinic for complete physical examination.  He generally has been doing well.  He has a history of anxiety which has been stable while taking sertraline.  He notes that occasionally he will feel nervous or on edge and does worry about some things.  He scored 1 on the TERRENCE questionnaire.  He would like to continue his medication.  He denies significant depression symptoms.  He has been able to cope with stress.  He is currently  and working as well as attending school for a masters.      He has had a mildly elevated cholesterol in the past.  He tries to remain physically active in various sports including hockey and soccer.  Recently, he did note that there was a tinge of blood in some mucus.  The past he did use some tobacco.  Denies sore throat, fever, or chills.  One concern is that he does have some excessive sweating in his armpit areas would like Drysol for that.  He otherwise feels well.    The following portions of the patient's history were reviewed and updated as appropriate: allergies, current medications, past family history, past medical history, past social history, past surgical history and problem list.    Review of Systems   A 12  "point comprehensive review of systems was negative except as noted.      Current Outpatient Prescriptions   Medication Sig Dispense Refill     EPINEPHrine (EPIPEN 2-DALE) 0.3 mg/0.3 mL atIn INJECT 0.3ML INTO THE SHOULDER, THIGH OR BUTTOCKS ONCE 2 Pre-filled Pen Syringe 1     sertraline (ZOLOFT) 50 MG tablet TAKE 1 TABLET DAILY 90 tablet 3     aluminum chloride (DRYSOL) 20 % external solution Apply topically 2 (two) times a day. 60 mL 6     No current facility-administered medications for this visit.        Objective:      /60  Pulse 64  Temp 98.3  F (36.8  C) (Oral)   Resp 16  Ht 5' 9.5\" (1.765 m)  Wt 169 lb (76.7 kg)  BMI 24.6 kg/m2      General appearance: alert, appears stated age and cooperative  Head: Normocephalic, without obvious abnormality, atraumatic  Eyes: conjunctivae/corneas clear. PERRL, EOM's intact.   Ears: normal TM's and external ear canals both ears  Nose: Nares normal. Septum midline. Mucosa normal. No drainage or sinus tenderness.  Throat: lips, mucosa, and tongue normal; teeth and gums normal  Neck: no adenopathy, supple, symmetrical, trachea midline and thyroid not enlarged, symmetric, no tenderness/mass/nodules  Back: symmetric, no curvature. ROM normal. No CVA tenderness.  Lungs: clear to auscultation bilaterally  Heart: regular rate and rhythm, S1, S2 normal, no murmur, click, rub or gallop  Abdomen: soft, non-tender; bowel sounds normal; no masses,  no organomegaly  Genitourinary: No scrotal masses, no inguinal hernia  Extremities: extremities normal, atraumatic, no cyanosis or edema  Skin: Skin color, texture, turgor normal. No rashes or lesions  Lymph nodes: Cervical nodes normal.  Neurologic: Alert and oriented X 3. Normal coordination and gait         No results found for this or any previous visit (from the past 168 hour(s)).       This note has been dictated using voice recognition software. Any grammatical or context distortions are unintentional and inherent to the " software

## 2021-06-16 PROBLEM — R14.0 BLOATING: Status: ACTIVE | Noted: 2019-01-14

## 2021-06-16 NOTE — PROGRESS NOTES
Assessment/Plan:       1. Concussion without loss of consciousness, initial encounter  She likely has a mild concussion without any significant loss of consciousness.  Recommend continuing with symptomatic management at this time and following up if symptoms are not improving or worsening over the next 2 weeks.  I recommend avoiding extended screen time and allow brain rest.  He can try Dramamine if he is having any increased dizziness and nausea for symptomatic management.  Continue with ibuprofen as needed for headaches.  He was recommended to follow-up if symptoms are not improving or worsening.  Education was provided to the patient today regarding this.    2. Laceration of forehead without complication  Acute laceration of his forehead with injury from the concussion.  It appears to be healing well no further recommendation for treatment continue with utilizing the Steri-Strips that are applied.  As these fall off no need to reapply.  He can place triple antibiotic ointment or Neosporin over the area to help with decreasing scarring.  He is in agreement with this plan following up if symptoms are not improving or if presence of infection occur.        Subjective:      Cristi Ortega is a 33 y.o. male who presents for possible concussion. Hit his head with the edge of a car door. He got a small laceration on his forehead and a friend who is a RN was able to cleanse the site and put on some steri strips. Site is mildly tender but is without redness, swelling, or bruising. He is since having slight headedness intermittent today and a tension type headache that is different from his migraine. 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 He denies any vision changes or vomiting. He did have an episode of nausea today but this resolved. He denies any confusion. Did have a little more difficulty with concentration today while working.   No chest pain, shortness of breath, or  "difficulty breathing.    The following portions of the patient's history were reviewed and updated as appropriate: allergies, current medications and problem list.    Review of Systems   Pertinent items are noted in HPI.      Objective:      /62 (Patient Site: Left Arm, Patient Position: Sitting, Cuff Size: Adult Regular)  Pulse 74  Resp 16  Ht 5' 9.5\" (1.765 m)  Wt 173 lb 1.6 oz (78.5 kg)  BMI 25.2 kg/m2    General appearance: alert, appears stated age and cooperative  Head: Normocephalic, without obvious abnormality, atraumatic  Lungs: clear to auscultation bilaterally  Heart: regular rate and rhythm, S1, S2 normal, no murmur, click, rub or gallop  Pulses: 2+ and symmetric  Neurologic: Alert and oriented X 3, normal strength and tone. Normal symmetric reflexes. Normal coordination and gait   Skin: Small 1 cm in length laceration appears to be healing well covered with Steri-Strips no surrounding erythema, bruising, or swelling.  No discharge is present.    "

## 2021-06-17 NOTE — PROGRESS NOTES
HISTORY OF PRESENT ILLNESS  Patient reports that he has had traces of blood in the phlegm for the last several months.  He has had a cold through the Winter a few times and associated sore throat. He reports that he notices the blood in the phlegm when he coughs it up on occasion. He has noticed after he smokes. However there were times that it occurred without smoking. No history of nosebleeds. It is occurring a few times a week. Never blood clots just streaks of blood.     REVIEW OF SYSTEMS  Review of Systems: a 10-system review was performed. Pertinent positives are noted in the HPI and on a separate scanned document in the chart.    PMH, PSH, FH and SH has documented in the EHR.      EXAM    CONSTITUTIONAL  General Appearance:  Normal, well developed, well nourished, no obvious distress  Ability to Communicate:  communicates appropriately.    HEAD AND FACE  Appearance and Symmetry:  Normal, no scalp or facial scarring or suspicious lesions.  Paranasal sinuses tenderness:  Normal, Paranasal sinuses non tender    EARS  Clinical speech reception threshold:  Normal, able to hear normal speech.  Auricle:  Normal, Auricles without scars, lesions, masses.  External auditory canal:  Normal, External auditory canal normal.  Tympanic membrane:  Normal, Tympanic membranes normal without swelling or erythema.  Tympanic membrane mobility:  Normal, Normal tympanic membrane mobility.    NOSE (speculum or scope)  Architecture:  Normal, Grossly normal external nasal architecture with no masses or lesions.  Mucosa:  Normal mucosa, No polyps or masses.  Septum:  Normal, Septum non-obstructing.  Turbinates:  Normal, No turbinate abnormalities    ORAL CAVITY AND OROPHARYNX  Lips:  Normal.  Dental and gingiva:  Normal, No obvious dental or gingival disease.  Mucosa:  Normal, Moist mucous membranes. Tiny papilloma at the tip of the uvula.  Tongue:  Normal, Tongue mobile with no mucosal abnormalities  Hard and soft palate:  Normal,  Hard and soft palate without cleft or mucosal lesions.  Oral pharynx:  Normal, Posterior pharynx without lesions or remarkable asymmetry.  Saliva:  Normal, Clear saliva.  Masses:  Normal, No palpable masses or pathologically enlarged lymph nodes.    LARYNGOSCOPY  Risks and benefit of Flexible laryngeal endoscopy were discussed with the patient and they wished to proceed.  The nose was sprayed with 4% lidocaine / 1% phenylephrine.  After allowing adequate time for anesthesia the procedure was started.  Patient tolerated the procedure well.  See exam note for findings.    LARYNX AND HYPOPHARYNX (mirror or scope)  Voice Quality:  Normal voice quality.  Supra glottis:  Normal, No edema or erythema.  Epiglottis:  Normal, No epiglottic mass or mucosal lesions.  Pyriform sinuses:  Normal, Pyriform sinuses clear.  Vocal cords appearance:  Normal, Vocal cord motion symmetric.  Vocal cord motion:  Normal, Vocal cord motion symmetric  Endolarynx:  Normal, No Endolaryngeal mass or mucosal lesions.    NECK  Masses/lymph nodes:  Normal, No worrisome neck masses or lymph nodes.  Salivary glands:  Normal, Parotid and submandibular glands.  Trachea and larynx position:  Normal, Trachea and larynx midline.  Thyroid:  Normal, No thyroid abnormality.  Tenderness:  Normal, No cervical tenderness.  Suppleness:  Normal, Neck supple    NEUROLOGICAL  Speech pattern:  Normal, Proasaic    RESPIRATORY  Symmetry and Respiratory effort:  Normal, Symmetric chest movement and expansion with no increased intercostal retractions or use of accessory muscles.     IMPRESSION  Papilloma tip of uvula.     RECOMMENDATION  The lesion removed using a combination of biopsy forceps and scissors. Placed in formalin and sent to pathology.    David Medina MD

## 2021-06-17 NOTE — PATIENT INSTRUCTIONS - HE
"Patient Instructions by Raymon Hernandez CNP at 9/26/2019  1:20 PM     Author: Raymon Hernandez CNP Service: -- Author Type: Nurse Practitioner    Filed: 9/26/2019  2:27 PM Encounter Date: 9/26/2019 Status: Signed    : Raymon Hernandez CNP (Nurse Practitioner)         Patient Education     Insect, Spider, and Scorpion Bites and Stings     The black  (top) and brown recluse (bottom) are two poisonous spiders found in the [place].     Most insect bites are harmless and cause only minor swelling or itching. But if youre allergic to insects such as wasps or bees, a sting can cause a life-threatening allergic reaction. Some ticks can carry and transmit serious diseases. The venom (poison) from scorpions and certain spiders can also be deadly, although this is rare. Knowing when to seek emergency care could save your life.  When to go to the emergency room (ER)    Scorpion sting    Bite from a black, red, or brown  spider or brown recluse spider    Severe pain or swelling at the site of bite    A tick that is embedded in your skin and can not be easily removed at home    Signs of an allergic reaction such as:  ? Hives  ? Swelling of your eyes, lips, or the inside of your throat  ? Trouble breathing  ? Dizziness or confusion  What to expect in the ER    If youre having trouble breathing, youll be given oxygen through a mask. In case of severe breathing difficulty, you may have a tube inserted in your throat and be placed on a ventilator (breathing machine).    If you are having a severe allergic reaction from a sting (called anaphylaxis), you may be given a shot of epinephrine. If it is known that you are allergic to bee or wasp stings, your doctor may give you a prescription for an \"epi-pen\" that you can keep with you at all times in case of a sting.    You may receive antivenin (a substance that reverses the effects of poison) for some spider bites and scorpion stings. Because antivenin can " sometimes cause other problems, your doctor will weigh the risks and benefits of this treatment.    Steroids such as prednisone are often used to treat allergic reactions. In many cases, your doctor will also prescribe an antihistamine to help relieve itching.  Easing symptoms of an insect bite or sting    Try to remove a stinger you can see. Use your fingernail, a knife edge, or credit card to scrape against the skin. Do not squeeze or pull.    Apply ice or a cold compress to reduce pain and swelling (keep a thin cloth between the cold source and the skin).   Date Last Reviewed: 12/1/2016 2000-2019 [employer]. [address] [place] [address], [place] [address]. All rights reserved. This information is not intended as a substitute for professional medical care. Always follow your healthcare professional's instructions.

## 2021-06-22 NOTE — PROGRESS NOTES
Assessment/ Plan     1. Abdominal bloating  Patient has had about a 2-week history of some abdominal bloating, cramping, and loose stools.  We discussed the broad differential including viral, other infection, irritable bowel syndrome, other.  His abdominal x-ray was normal.  He seemed quite concerned about his symptoms so we decided to proceed with some blood work and stool samples.  He has a follow-up appointment in 6 days with his primary care physician for a complete physical exam.  If things are not improving at that time, they can proceed with further workup.  In the meantime, discussed some conservative measures that he can try at home such as a brat diet and Pepto-Bismol.  He is not having any red flag symptoms such as severe pain, fever, or dehydration.  He has just a little bit of blood when he wipes likely related to his hemorrhoid.  - XR Abdomen AP; Future    2. Loose stools  - Comprehensive Metabolic Panel  - HM2(CBC w/o Differential)  - Thyroid Stimulating Hormone (TSH)  - C. difficile Toxigenic by PCR; Future  - Cryptosporidium/Giardia Combo, Stool; Future  - Culture, Stool; Future  - Ova and Parasite, Stool; Future      Subjective:       Cristi Ortega is a 33 y.o. male who presents for evaluation of GI symptoms.  Patient states that symptoms started about 2 weeks ago.  He feels like he has had a lot of abdominal bloating.  He is also had more burping and passing gas.  He has had some cramping, but not specific localized pain.  On several days he has had some diarrhea which he describes as watery stools, several times per day.  On several days he will just have 1 or 2 loose stools.  He is not waking up in the middle the night with diarrhea.  He has not had any fevers.  He has not had any nausea or vomiting.  Appetite has been fairly normal.  He said no recent travel.  He has had some change in diet and that has been drinking more alcohol and eating junk year food.  Although, he states the last 5 days  or so he has been back to his normal diet.  He does admit that he does have some anxiety at baseline.  He states sometimes with his anxiety he will have 1 or 2 days of diarrhea but then it usually resolves.  He had some blood when he wipes after a bowel movement.  There is been no blood in the stool or in the toilet bowl.  He does know that he has a hemorrhoid.  He states that when he was a child, he was worked up for GI issues and they ended up just telling him he might be lactose intolerant.  He has not really avoided lactose most of his life.  As far as family history, he had a grandfather who had colon cancer in his late 50s.  Neither of his parents have had any issues.  He has not had any unusual foods.  He has not been on antibiotics recently.  He is not missing work because of this.    Relevant past medical, family, surgical, and social history reviewed with patient, unless noted in HPI, not pertinent for this visit.    Review of Systems   A 12 point comprehensive review of systems was negative except as noted.      Current Outpatient Medications   Medication Sig Dispense Refill     aluminum chloride (DRYSOL) 20 % external solution Apply topically 2 (two) times a day. 60 mL 6     EPINEPHrine (EPIPEN 2-DALE) 0.3 mg/0.3 mL atIn INJECT 0.3ML INTO THE SHOULDER, THIGH OR BUTTOCKS ONCE 2 Pre-filled Pen Syringe 1     sertraline (ZOLOFT) 50 MG tablet TAKE 1 TABLET DAILY 90 tablet 0     No current facility-administered medications for this visit.        Objective:      /76   Pulse 84   Wt 181 lb 8 oz (82.3 kg)   BMI 26.42 kg/m        General appearance: alert, appears stated age and cooperative  Lungs: clear to auscultation bilaterally  Heart: regular rate and rhythm, S1, S2 normal, no murmur, click, rub or gallop  Abdomen: soft, non-tender; bowel sounds normal; no masses,  no organomegaly    Abdominal x-ray: Personally reviewed, normal.    No results found for this or any previous visit (from the past 168  hour(s)).       This note has been dictated using voice recognition software. Any grammatical or context distortions are unintentional and inherent to the software

## 2021-06-23 NOTE — PATIENT INSTRUCTIONS - HE
Keep a food diary for possible triggers  We will await your culture results  Consider metamucil/fiber given loose stools  If not improving you can consider seeing a gastroenterologist  I do recommend a flu shot this year

## 2021-06-23 NOTE — PROGRESS NOTES
Assessment/ Plan     1. Routine general medical examination at a health care facility    Recommend that he remain physically active    Encouraged the influenza vaccine which he may get through work    Tdap is up-to-date    2. Change in consistency of stool  3. Bloating  This may be secondary to a viral infection, irritable bowel syndrome, parasite, versus other etiology    His initial stool cultures were negative  Ova and parasite samples are pending  There is no evidence of Giardia or Cryptosporidium  There has been interval improvement  Recommend that patient keep a food diary for possible food triggers or food intolerance  He may increase fiber/Metamucil daily    Discussed next steps.  If having ongoing symptoms consider referral to gastroenterology  Patient may ultimately require a colonoscopy and possibly upper endoscopy  Can consider a trial of an antacid as well as ranitidine    Patient will await the stool cultures and provide an update as needed    4. Hyperlipidemia, unspecified hyperlipidemia type    He has had elevated cholesterol readings  Recommend he continue work on diet excise  Follow-up to check lipid [facility] the future    5. Anxiety    He is currently stable  PHQ-9 score is 0.5  TERRENCE- 7 score is 2.5  He will continue sertraline 50 mg daily  Patient prefers not to wean medication at this time      Subjective:       Cristi Ortega is a 33 y.o. male who presents to the clinic for a complete physical examination.  His medical history is notable for hyperlipidemia as well as anxiety.  He last had a total cholesterol of 212 with an LDL of 153.  He does not wish to do any blood testing today.    Recent concern has been a change in bowel habits.  Over the past several weeks he has noted bloating and some cramping in his abdomen.  He has had a change in stool consistently with loose bowel movements.  He cannot think of any specific food triggers.  He reports that as a child he did have some similar symptoms  but does not recall exactly what those were.  He denies dark and tarry stools or passage of bright red blood per rectum.  He has had occasional lactose issues but that does not seem to be the primary problem.  He believes he does have hemorrhoids as well.  He denies any family history of inflammatory bowel disease.  He cannot think of any unusual exposures.    He has noted interval improvement since his last visit.  He did get evaluated by Dr. Lanza and had a normal comprehensive metabolic panel, normal blood counts, and a normal thyroid test.  His initial stool culture was negative.  His testing was negative for Cryptosporidium or Giardia.  Ova and parasite samples are pending.    Additionally, he takes sertraline given a long-standing history of anxiety.  Generally, this medication has been very effective.  There are times he can worry.  His mood can be low at times but this has not been a significant problem.  He would like to continue his current medication.    Social history is notable for the fact that his wife is now expecting their first child.  He states that his symptoms did precede knowledge that she was pregnant.    Review of systems otherwise negative.  He is able to tolerate physical exertion.  He remains active in playing soccer.    The following portions of the patient's history were reviewed and updated as appropriate: allergies, current medications, past family history, past medical history, past social history, past surgical history and problem list. Medications have been reconciled    Review of Systems   A 12 point comprehensive review of systems was negative except as noted.      Current Outpatient Medications   Medication Sig Dispense Refill     aluminum chloride (DRYSOL) 20 % external solution Apply topically 2 (two) times a day. 60 mL 6     EPINEPHrine (EPIPEN 2-DALE) 0.3 mg/0.3 mL atIn INJECT 0.3ML INTO THE SHOULDER, THIGH OR BUTTOCKS ONCE 2 Pre-filled Pen Syringe 1     sertraline (ZOLOFT)  "50 MG tablet TAKE 1 TABLET DAILY 90 tablet 0     No current facility-administered medications for this visit.        Objective:      /74   Pulse 72   Ht 5' 9.5\" (1.765 m)   Wt 179 lb 9.6 oz (81.5 kg)   BMI 26.14 kg/m        General appearance: alert, appears stated age and cooperative  Head: Normocephalic, without obvious abnormality, atraumatic  Eyes: conjunctivae/corneas clear. PERRL, EOM's intact.   Ears: normal TM's and external ear canals both ears  Nose: Nares normal. Septum midline. Mucosa normal. No drainage or sinus tenderness.  Throat: lips, mucosa, and tongue normal; teeth and gums normal  Neck: no adenopathy, supple, symmetrical, trachea midline and thyroid not enlarged  Back: symmetric, no curvature. ROM normal. No CVA tenderness.  Lungs: clear to auscultation bilaterally  Heart: regular rate and rhythm, S1, S2 normal, no murmur, click, rub or gallop  Abdomen: soft, non-tender; bowel sounds normal; no masses,  no organomegaly  Genitourinary: Penis is circumcised, no scrotal masses, no inguinal hernia  Extremities: extremities normal, atraumatic, no cyanosis or edema  Skin: Skin color, texture, turgor normal. No rashes or lesions  Lymph nodes: Cervical nodes normal.  Neurologic: Alert and oriented X 3         Recent Results (from the past 168 hour(s))   Comprehensive Metabolic Panel   Result Value Ref Range    Sodium 140 136 - 145 mmol/L    Potassium 3.8 3.5 - 5.0 mmol/L    Chloride 105 98 - 107 mmol/L    CO2 23 22 - 31 mmol/L    Anion Gap, Calculation 12 5 - 18 mmol/L    Glucose 83 70 - 125 mg/dL    BUN 14 8 - 22 mg/dL    Creatinine 1.02 0.70 - 1.30 mg/dL    GFR MDRD Af Amer >60 >60 mL/min/1.73m2    GFR MDRD Non Af Amer >60 >60 mL/min/1.73m2    Bilirubin, Total 0.7 0.0 - 1.0 mg/dL    Calcium 9.6 8.5 - 10.5 mg/dL    Protein, Total 7.5 6.0 - 8.0 g/dL    Albumin 4.6 3.5 - 5.0 g/dL    Alkaline Phosphatase 51 45 - 120 U/L    AST 17 0 - 40 U/L    ALT 16 0 - 45 U/L   HM2(CBC w/o Differential) "   Result Value Ref Range    WBC 10.0 4.0 - 11.0 thou/uL    RBC 5.15 4.40 - 6.20 mill/uL    Hemoglobin 15.5 14.0 - 18.0 g/dL    Hematocrit 44.6 40.0 - 54.0 %    MCV 87 80 - 100 fL    MCH 30.1 27.0 - 34.0 pg    MCHC 34.8 32.0 - 36.0 g/dL    RDW 12.1 11.0 - 14.5 %    Platelets 310 140 - 440 thou/uL    MPV 7.8 7.0 - 10.0 fL   Thyroid Stimulating Hormone (TSH)   Result Value Ref Range    TSH 2.62 0.30 - 5.00 uIU/mL   C. difficile Toxigenic by PCR   Result Value Ref Range    C.Difficile Toxigenic by PCR Negative Negative    Ribotype 027/NAP1/B1 Presumptive Negative Presumptive Negative   Cryptosporidium/Giardia Combo, Stool   Result Value Ref Range    Cryptosporidium Antigen No Cryptosporidium detected No Cryptosporidium detected    Giardia Antigen No Giardia cysts detected No Giardia cysts detected   Culture, Stool   Result Value Ref Range    Culture       No Salmonella, Shigella, Yersinia, or Campylobacter.          This note has been dictated using voice recognition software. Any grammatical or context distortions are unintentional and inherent to the software

## 2021-06-25 NOTE — TELEPHONE ENCOUNTER
Refill Approved    Rx renewed per Medication Renewal Policy. Medication was last renewed on 12/13/18.    Jorge Bautista, [facility] Triage/Med Refill 3/15/2019     Requested Prescriptions   Pending Prescriptions Disp Refills     sertraline (ZOLOFT) 50 MG tablet [Pharmacy Med Name: SERTRALINE TAB 50MG] 90 tablet 0     Sig: TAKE 1 TABLET DAILY    SS[facility] Refill Protocol  Passed - 3/12/2019  2:28 AM       Passed - PCP or prescribing provider visit in last year    Last office visit with prescriber/PCP: 8/16/2017 Seth See MD OR same dept: 1/8/2019 Sofía Lanza MD OR same specialty: 1/8/2019 Sofía Lanza MD  Last physical: 1/14/2019 Last MTM visit: Visit date not found   Next visit within 3 mo: Visit date not found  Next physical within 3 mo: Visit date not found  Prescriber OR PCP: Seth See MD  Last diagnosis associated with med order: 1. Anxiety  - sertraline (ZOLOFT) 50 MG tablet [Pharmacy Med Name: SERTRALINE TAB 50MG]; TAKE 1 TABLET DAILY  Dispense: 90 tablet; Refill: 0    If protocol passes may refill for 12 months if within 3 months of last provider visit (or a total of 15 months).

## 2021-06-28 NOTE — PROGRESS NOTES
Progress Notes by Raymon Hernandez CNP at 9/26/2019  1:20 PM     Author: Raymon Hernandez CNP Service: -- Author Type: Nurse Practitioner    Filed: 9/26/2019  3:39 PM Encounter Date: 9/26/2019 Status: Signed    : Raymon Hernandez CNP (Nurse Practitioner)       Chief Complaint   Patient presents with   ? Insect Bite     x2d, R hamstring, swelling, redness, very itchy, warm to touch       HPI:  Cristi Ortega is a 34 y.o. male who presents today complaining of localized reaction following a bee sting while mowing lawn 2 days ago. Reports application of ice and elevation with some relief. No shortness of breath or difficulty breathing.     History obtained from the patient.    Problem List:  2019-01: Bloating  Onychomycosis  Anxiety  Hyperlipidemia  Ankle Joint Pain  Orgasmic Cephalgia      No past medical history on file.    Social History     Tobacco Use   ? Smoking status: Never Smoker   ? Smokeless tobacco: Former User   Substance Use Topics   ? Alcohol use: Not on file       Review of Systems   Constitutional: Negative.    Skin: Positive for rash.   All other systems reviewed and are negative.      Vitals:    09/26/19 1353   BP: 129/81   Patient Site: Right Arm   Patient Position: Sitting   Cuff Size: Adult Regular   Pulse: 81   Resp: 16   Temp: 98.1  F (36.7  C)   TempSrc: Oral   SpO2: 100%   Weight: 172 lb 2 oz (78.1 kg)       Physical Exam  Constitutional:       General: He is not in acute distress.     Appearance: Normal appearance. He is not ill-appearing.   Neck:      Musculoskeletal: Neck supple.   Cardiovascular:      Rate and Rhythm: Normal rate and regular rhythm.      Pulses: Normal pulses.      Heart sounds: Normal heart sounds. No murmur. No friction rub. No gallop.    Pulmonary:      Effort: Pulmonary effort is normal. No respiratory distress.      Breath sounds: Normal breath sounds. No stridor. No wheezing, rhonchi or rales.   Chest:      Chest wall: No tenderness.   Skin:     General:  Skin is warm.      Capillary Refill: Capillary refill takes less than 2 seconds.      Findings: Erythema and rash present.      Comments: RIGHT upper thigh, lateral aspect with area of 5x5cm erythema, mild warmth, with no drainage or induration.   No inguinal adenopathy    Neurological:      General: No focal deficit present.      Mental Status: He is alert and oriented to person, place, and time. Mental status is at baseline.   Psychiatric:         Mood and Affect: Mood normal.         Behavior: Behavior normal.         No notes on file    Labs:  No results found for this or any previous visit (from the past 72 hour(s)).    Radiology:No results found.    Clinical Decision Making:  At the end of the encounter, I discussed results, diagnosis, medications. Discussed red flags for immediate return to clinic/ER, as well as indications for follow up if no improvement. Patient understood and agreed to plan.     BREANNA Jarvis, CNP     1. Bee sting reaction, accidental or unintentional, initial encounter  predniSONE (DELTASONE) 20 MG tablet    cetirizine (ZYRTEC) 10 MG tablet    hydrocortisone with aloe 1 % Crea cream    EPINEPHrine (EPIPEN 2-DALE) 0.3 mg/0.3 mL injection         Patient Instructions       Patient Education     Insect, Spider, and Scorpion Bites and Stings     The black  (top) and brown recluse (bottom) are two poisonous spiders found in the [place].     Most insect bites are harmless and cause only minor swelling or itching. But if youre allergic to insects such as wasps or bees, a sting can cause a life-threatening allergic reaction. Some ticks can carry and transmit serious diseases. The venom (poison) from scorpions and certain spiders can also be deadly, although this is rare. Knowing when to seek emergency care could save your life.  When to go to the emergency room (ER)    Scorpion sting    Bite from a black, red, or brown  spider or brown recluse spider    Severe pain or  "swelling at the site of bite    A tick that is embedded in your skin and can not be easily removed at home    Signs of an allergic reaction such as:  ? Hives  ? Swelling of your eyes, lips, or the inside of your throat  ? Trouble breathing  ? Dizziness or confusion  What to expect in the ER    If youre having trouble breathing, youll be given oxygen through a mask. In case of severe breathing difficulty, you may have a tube inserted in your throat and be placed on a ventilator (breathing machine).    If you are having a severe allergic reaction from a sting (called anaphylaxis), you may be given a shot of epinephrine. If it is known that you are allergic to bee or wasp stings, your doctor may give you a prescription for an \"epi-pen\" that you can keep with you at all times in case of a sting.    You may receive antivenin (a substance that reverses the effects of poison) for some spider bites and scorpion stings. Because antivenin can sometimes cause other problems, your doctor will weigh the risks and benefits of this treatment.    Steroids such as prednisone are often used to treat allergic reactions. In many cases, your doctor will also prescribe an antihistamine to help relieve itching.  Easing symptoms of an insect bite or sting    Try to remove a stinger you can see. Use your fingernail, a knife edge, or credit card to scrape against the skin. Do not squeeze or pull.    Apply ice or a cold compress to reduce pain and swelling (keep a thin cloth between the cold source and the skin).   Date Last Reviewed: 12/1/2016 2000-2019 [employer]. [address] [place] [address], [place] [address]. All rights reserved. This information is not intended as a substitute for professional medical care. Always follow your healthcare professional's instructions.                     "

## 2021-10-10 ENCOUNTER — HEALTH MAINTENANCE LETTER (OUTPATIENT)
Age: 36
End: 2021-10-10

## 2021-12-04 ENCOUNTER — HEALTH MAINTENANCE LETTER (OUTPATIENT)
Age: 36
End: 2021-12-04

## 2021-12-28 ENCOUNTER — E-VISIT (OUTPATIENT)
Dept: URGENT CARE | Facility: URGENT CARE | Age: 36
End: 2021-12-28
Payer: COMMERCIAL

## 2021-12-28 ENCOUNTER — LAB (OUTPATIENT)
Dept: LAB | Facility: CLINIC | Age: 36
End: 2021-12-28
Attending: PHYSICIAN ASSISTANT
Payer: COMMERCIAL

## 2021-12-28 DIAGNOSIS — Z20.822 CLOSE EXPOSURE TO 2019 NOVEL CORONAVIRUS: Primary | ICD-10-CM

## 2021-12-28 DIAGNOSIS — Z20.822 CLOSE EXPOSURE TO 2019 NOVEL CORONAVIRUS: ICD-10-CM

## 2021-12-28 PROCEDURE — U0005 INFEC AGEN DETEC AMPLI PROBE: HCPCS

## 2021-12-28 PROCEDURE — U0003 INFECTIOUS AGENT DETECTION BY NUCLEIC ACID (DNA OR RNA); SEVERE ACUTE RESPIRATORY SYNDROME CORONAVIRUS 2 (SARS-COV-2) (CORONAVIRUS DISEASE [COVID-19]), AMPLIFIED PROBE TECHNIQUE, MAKING USE OF HIGH THROUGHPUT TECHNOLOGIES AS DESCRIBED BY CMS-2020-01-R: HCPCS

## 2021-12-28 PROCEDURE — 99421 OL DIG E/M SVC 5-10 MIN: CPT | Performed by: PHYSICIAN ASSISTANT

## 2021-12-28 NOTE — PATIENT INSTRUCTIONS
Dear Cristi,    Based on your exposure to COVID-19 (coronavirus), we would like to test you for this virus. I have placed an order for this test. The best time for testing is 5-7 days after the exposure.    How to schedule:  Go to your [employer] home page and scroll down to the section that says  You have an appointment that needs to be scheduled  and click the large green button that says  Schedule Now  and follow the steps to find the next available opening.     If you are unable to complete these [employer] scheduling steps, please call [phone] to schedule your testing.     Monoclonal antibody treatment after exposure:  Because you have been exposed to COVID-19, you may be able to receive a treatment with monoclonal antibodies. This treatment can lower your risk of severe illness and going to the hospital. It is given through an IV or under your skin (subcutaneous) and must be given at an infusion center.   To be eligible, you must be 12 years or older, at least 88 pounds and:    Are not fully vaccinated against COVID-19, OR    Are immunocompromised     If you would like to sign up to be considered to receive the monoclonal antibody medicine, please complete a participation form through the [facility] here:  MNRAP ([email or web address]). You may also call the [facility] COVID-19 Public Hotline at 1-447.393.2636 (open Mon-Fri: 9am-7pm and Sat: 10am-6pm).     Not all people who are eligible will receive the medicine since supply is limited. You will be contacted in the next 1 to 2 business days only if you are selected. If you do not receive a call, you have not been selected to receive the medicine. If you have any questions about this medication, please contact your primary care provider. For more information, see [email or web address]    Return to work/school/ guidance:   Please let your workplace manager and  staffing office know when your quarantine ends. We cannot give you an exact date as it depends on the information below. You can calculate this on your own or work with your manager/staffing office to calculate this.    Quarantine Guidelines:  You are considered exposed if you have been within 6 feet of an infected person(s) for 15 minutes or more over a 24-hour period. Quarantine will start after the LAST time you had contact with the infected person while they were contagious (for example, if you saw someone on Monday and Wednesday, your quarantine would start after Wednesday).     If you have NO symptoms (asymptomatic):    Stay home for 14 days (quarantine) after your LAST contact with a person who has COVID-19 (this remains the CDC recommendation for greatest protection against spread of COVID-19), OR    10-day quarantine with no test, OR    Minimum 7-day quarantine with negative RT-PCR test collected on day 5 or later    Quarantine Guideline exceptions:    People who have been fully vaccinated do not need to quarantine unless they have symptoms. You are considered fully vaccinated 2 weeks after your 2nd dose in a 2-dose series or 2 weeks after a single-dose series. This includes vaccinated health care workers.  o Fully vaccinated people should still get tested 5-7 days after exposure, even if they don t have symptoms.   Note: If you have ongoing exposure to the COVID-positive person, this quarantine period may be more than 14 days. For example, if you continue to be exposed to your child who tested positive and cannot isolate from them, then the quarantine of 7-14 days can't start until your child is no longer contagious. This is typically 10 days from onset of the child's symptoms. So, the total duration may be 17-24 days in this case.   Please contact your doctor if you have questions or call the [facility] Public Hotline: 1-653.248.5973 (Mon-Fri: 9am-7pm and Sat: 10am-6pm).     How to Quarantine:     Monitor your  symptoms until 14 days after your last exposure. If you develop signs or symptoms, isolate and get tested (even if you have been tested already).    Stay home and away from others. Don't go to school or anywhere else. Generally, quarantine means staying home from work but there are some exceptions to this. Please contact your workplace. Cover your mouth and nose with a face covering if you must be around other people.     Wash your hands and face often. Use soap and water.    What are the symptoms of COVID-19?  The most common symptoms are cough, fever and trouble breathing. Less common symptoms include headache, body aches, fatigue (feeling very tired), chills, sore throat, stuffy or runny nose, diarrhea (loose poop), loss of taste or smell, belly pain, and nausea or vomiting (feeling sick to your stomach or throwing up).  If you develop symptoms, follow these guidelines:    If you're normally healthy: Please start another eVisit.    If you have a serious health problem (like cancer, heart failure, an organ transplant or kidney disease): Call your specialty clinic. Let them know that you might have COVID-19.    Where can I get more information?    [facility] - About COVID-19: [email or web address]     CDC - What to Do If You're Sick:     www.cdc.gov/coronavirus/2019-ncov/about/steps-when-sick.html    CDC - Ending Home Isolation:  https://www.cdc.gov/coronavirus/2019-ncov/your-health/quarantine-isolation.html    CDC - Caring for Someone:  www.cdc.gov/coronavirus/2019-ncov/if-you-are-sick/care-for-someone.html    [facility] clinical trials (COVID-19 research studies): clinicalaffairs.[facility]/umn-clinical-trials    Below are the COVID-19 hotlines at the [facility] ([facility]). Interpreters are available.  o For health questions: Call 931-370-0967 or 1-329.407.7788 (7 am to 7 pm)  o For questions about schools and childcare: Call 988-447-7606 or [phone] (7 a.m. to 7  p.m.)

## 2021-12-29 ENCOUNTER — E-VISIT (OUTPATIENT)
Dept: URGENT CARE | Facility: URGENT CARE | Age: 36
End: 2021-12-29
Payer: COMMERCIAL

## 2021-12-29 DIAGNOSIS — Z20.822 CLOSE EXPOSURE TO 2019 NOVEL CORONAVIRUS: Primary | ICD-10-CM

## 2021-12-29 LAB — SARS-COV-2 RNA RESP QL NAA+PROBE: NEGATIVE

## 2021-12-29 PROCEDURE — 99421 OL DIG E/M SVC 5-10 MIN: CPT | Performed by: FAMILY MEDICINE

## 2021-12-30 ENCOUNTER — LAB (OUTPATIENT)
Dept: LAB | Facility: CLINIC | Age: 36
End: 2021-12-30
Attending: FAMILY MEDICINE
Payer: COMMERCIAL

## 2021-12-30 DIAGNOSIS — Z20.822 CLOSE EXPOSURE TO 2019 NOVEL CORONAVIRUS: ICD-10-CM

## 2021-12-30 PROCEDURE — U0005 INFEC AGEN DETEC AMPLI PROBE: HCPCS

## 2021-12-30 PROCEDURE — U0003 INFECTIOUS AGENT DETECTION BY NUCLEIC ACID (DNA OR RNA); SEVERE ACUTE RESPIRATORY SYNDROME CORONAVIRUS 2 (SARS-COV-2) (CORONAVIRUS DISEASE [COVID-19]), AMPLIFIED PROBE TECHNIQUE, MAKING USE OF HIGH THROUGHPUT TECHNOLOGIES AS DESCRIBED BY CMS-2020-01-R: HCPCS

## 2021-12-31 LAB — SARS-COV-2 RNA RESP QL NAA+PROBE: NEGATIVE

## 2022-01-26 ENCOUNTER — TRANSFERRED RECORDS (OUTPATIENT)
Dept: HEALTH INFORMATION MANAGEMENT | Facility: CLINIC | Age: 37
End: 2022-01-26
Payer: COMMERCIAL

## 2022-02-28 ENCOUNTER — OFFICE VISIT (OUTPATIENT)
Dept: FAMILY MEDICINE | Facility: CLINIC | Age: 37
End: 2022-02-28
Payer: COMMERCIAL

## 2022-02-28 VITALS
SYSTOLIC BLOOD PRESSURE: 133 MMHG | WEIGHT: 168.8 LBS | BODY MASS INDEX: 24.17 KG/M2 | HEART RATE: 84 BPM | RESPIRATION RATE: 16 BRPM | HEIGHT: 70 IN | DIASTOLIC BLOOD PRESSURE: 82 MMHG

## 2022-02-28 DIAGNOSIS — Z00.00 ROUTINE PHYSICAL EXAMINATION: Primary | ICD-10-CM

## 2022-02-28 DIAGNOSIS — E78.5 HYPERLIPIDEMIA, UNSPECIFIED HYPERLIPIDEMIA TYPE: ICD-10-CM

## 2022-02-28 DIAGNOSIS — F41.1 ANXIETY STATE: ICD-10-CM

## 2022-02-28 PROCEDURE — 99395 PREV VISIT EST AGE 18-39: CPT | Performed by: FAMILY MEDICINE

## 2022-02-28 PROCEDURE — 99213 OFFICE O/P EST LOW 20 MIN: CPT | Mod: 25 | Performed by: FAMILY MEDICINE

## 2022-02-28 ASSESSMENT — ANXIETY QUESTIONNAIRES
IF YOU CHECKED OFF ANY PROBLEMS ON THIS QUESTIONNAIRE, HOW DIFFICULT HAVE THESE PROBLEMS MADE IT FOR YOU TO DO YOUR WORK, TAKE CARE OF THINGS AT HOME, OR GET ALONG WITH OTHER PEOPLE: SOMEWHAT DIFFICULT
5. BEING SO RESTLESS THAT IT IS HARD TO SIT STILL: NOT AT ALL
3. WORRYING TOO MUCH ABOUT DIFFERENT THINGS: SEVERAL DAYS
2. NOT BEING ABLE TO STOP OR CONTROL WORRYING: SEVERAL DAYS
6. BECOMING EASILY ANNOYED OR IRRITABLE: NOT AT ALL
GAD7 TOTAL SCORE: 5
7. FEELING AFRAID AS IF SOMETHING AWFUL MIGHT HAPPEN: SEVERAL DAYS
1. FEELING NERVOUS, ANXIOUS, OR ON EDGE: SEVERAL DAYS

## 2022-02-28 ASSESSMENT — PATIENT HEALTH QUESTIONNAIRE - PHQ9
SUM OF ALL RESPONSES TO PHQ QUESTIONS 1-9: 0
5. POOR APPETITE OR OVEREATING: SEVERAL DAYS

## 2022-02-28 NOTE — PATIENT INSTRUCTIONS
Cristi,    I sent a refill of sertraline to your pharmacy.  Continue the current dose  You can set up a fasting lab appointment at your convenience to check your cholesterol and kidney profile as well as a blood sugar test    Seth See MD      Preventive Health Recommendations  Male Ages 26 - 39    Yearly exam:             See your health care provider every year in order to  o   Review health changes.   o   Discuss preventive care.    o   Review your medicines if your doctor has prescribed any.    You should be tested each year for STDs (sexually transmitted diseases), if you re at risk.     After age 35, talk to your provider about cholesterol testing. If you are at risk for heart disease, have your cholesterol tested at least every 5 years.     If you are at risk for diabetes, you should have a diabetes test (fasting glucose).  Shots: Get a flu shot each year. Get a tetanus shot every 10 years.     Nutrition:    Eat at least 5 servings of fruits and vegetables daily.     Eat whole-grain bread, whole-wheat pasta and brown rice instead of white grains and rice.     Get adequate Calcium and Vitamin D.     Lifestyle    Exercise for at least 150 minutes a week (30 minutes a day, 5 days a week). This will help you control your weight and prevent disease.     Limit alcohol to one drink per day.     No smoking.     Wear sunscreen to prevent skin cancer.     See your dentist every six months for an exam and cleaning.

## 2022-03-01 ENCOUNTER — E-VISIT (OUTPATIENT)
Dept: FAMILY MEDICINE | Facility: CLINIC | Age: 37
End: 2022-03-01
Payer: COMMERCIAL

## 2022-03-01 DIAGNOSIS — R11.2 NAUSEA AND VOMITING, INTRACTABILITY OF VOMITING NOT SPECIFIED, UNSPECIFIED VOMITING TYPE: ICD-10-CM

## 2022-03-01 DIAGNOSIS — R19.7 DIARRHEA, UNSPECIFIED TYPE: ICD-10-CM

## 2022-03-01 PROCEDURE — 99421 OL DIG E/M SVC 5-10 MIN: CPT | Performed by: FAMILY MEDICINE

## 2022-03-01 ASSESSMENT — ANXIETY QUESTIONNAIRES: GAD7 TOTAL SCORE: 5

## 2022-09-18 ENCOUNTER — HEALTH MAINTENANCE LETTER (OUTPATIENT)
Age: 37
End: 2022-09-18

## 2022-10-31 ENCOUNTER — LAB (OUTPATIENT)
Dept: LAB | Facility: CLINIC | Age: 37
End: 2022-10-31
Payer: COMMERCIAL

## 2022-10-31 DIAGNOSIS — Z31.9 UNSPECIFIED PROCREATIVE MANAGEMENT: ICD-10-CM

## 2022-10-31 PROCEDURE — 89322 SEMEN ANAL STRICT CRITERIA: CPT

## 2022-11-01 LAB
ABNORMAL SPERM MORPHOLOGY: 95
ABSTINENCE DAYS: 4 DAYS (ref 2–7)
AGGLUTINATION: NO
ANALYSIS TEMP - CENTIGRADE: 23 CENTIGRADE
COLLECTION METHOD: ABNORMAL
COLLECTION SITE: ABNORMAL
CONSENT TO RELEASE TO PARTNER: NO
DAL- RECEIVED TIME: ABNORMAL
HEAD DEFECT: 95 %
IMMOTILE: 46 %
LIQUEFIED: YES
MIDPIECE DEFECT: 39 %
NON-PROGRESSIVE MOTILITY: 5 %
NORMAL SPERM MORPHOLOGY: 5 % NORMAL FORMS
PROGRESSIVE MOTILITY: 49 %
ROUND CELLS: 0 MILLION/ML
SPECIMEN PH: 7 PH
SPECIMEN VOLUME: 1 ML
SPERM CONCENTRATION: 292.5 MILLION/ML
TAIL DEFECT: 3 %
TIME OF ANALYSIS: ABNORMAL
TOTAL PROGRESSIVE MOTILE NUMBER: 144 MILLION
TOTAL SPERM NUMBER: 293 MILLION
VISCOUS: YES
VITALITY: ABNORMAL

## 2023-05-07 ENCOUNTER — HEALTH MAINTENANCE LETTER (OUTPATIENT)
Age: 38
End: 2023-05-07

## 2023-06-23 NOTE — PROGRESS NOTES
SUBJECTIVE:   CC: Cristi Ortega is an 37 year old male who presents for preventative health visit.     This is a 37-year-old male who presents to clinic for complete physical examination.    His medical history is notable for hyperlipidemia as well as anxiety.    He has followed up with ophthalmology and has been diagnosed with ocular rosacea which has not been significantly bothersome recently. He was treated with doxycycline for a period of time but actually has not been taking that consistently     In the past he did have an evaluation for symptoms including abdominal bloating and cramping.  He had some changes in stool consistency with loose bowel movements.  He did have an evaluation with negative stool cultures and likely has irritable bowel syndrome.  He did follow-up with gastroenterology.     He has been taking sertraline 50 mg a day and has tolerated this medication quite well.    He would like to continue on the current dose.  His anxiety has generally been stable.    He does experience some right shoulder discomfort.  This is a dull aching discomfort.  He denies obvious injury.  He reports that rainouts has affected his toes and fingers recently.      He continues to work for [employer] within [employer].  He has 2 young children.        Patient has been advised of split billing requirements and indicates understanding: Yes  Healthy Habits:     Getting at least 3 servings of Calcium per day:  Yes    Bi-annual eye exam:  Yes    Dental care twice a year:  NO    Sleep apnea or symptoms of sleep apnea:  None    Diet:  Regular (no restrictions)    Frequency of exercise:  2-3 days/week    Duration of exercise:  15-30 minutes    Taking medications regularly:  Yes    Medication side effects:  None    PHQ-2 Total Score: 0    Additional concerns today:  Yes        Today's PHQ-2 Score:   PHQ-2 ( 1999 Pfizer) 2/28/2022   Q1: Little interest or pleasure in doing things 0   Q2: Feeling down, depressed or hopeless 0   PHQ-2 Score  0   Q1: Little interest or pleasure in doing things Not at all   Q2: Feeling down, depressed or hopeless Not at all   PHQ-2 Score 0       Abuse: Current or Past(Physical, Sexual or Emotional)- No  Do you feel safe in your environment? Yes    Have you ever done Advance Care Planning? (For example, a Health Directive, POLST, or a discussion with a medical provider or your loved ones about your wishes): No, advance care planning information given to patient to review.  Patient declined advance care planning discussion at this time.    Social History     Tobacco Use     Smoking status: Never Smoker     Smokeless tobacco: Former User   Substance Use Topics     Alcohol use: Not on file     Comment: occas     If you drink alcohol do you typically have >3 drinks per day or >7 drinks per week? No    Alcohol Use 2/28/2022   Prescreen: >3 drinks/day or >7 drinks/week? No   No flowsheet data found.    Last PSA: No results found for: PSA    Reviewed orders with patient. Reviewed health maintenance and updated orders accordingly - Yes  Current Outpatient Medications   Medication Sig Dispense Refill     EPINEPHrine (EPIPEN JR) 0.15 MG/0.3ML injection 2-pack Inject 0.15 mg into the muscle as needed for anaphylaxis       sertraline (ZOLOFT) 50 MG tablet Take 1 tablet (50 mg) by mouth daily 90 tablet 3     Allergies   Allergen Reactions     Bees Swelling     Recent Labs   Lab Test 10/21/20  0928 01/08/19  1542 01/11/18  0846   *  --  153*   HDL 40  --  39*   TRIG 120  --  100   ALT  --  16  --    CR 0.84 1.02  --    GFRESTIMATED >60 >60  --    GFRESTBLACK >60 >60  --    POTASSIUM 4.1 3.8  --    TSH  --  2.62  --         Reviewed and updated as needed this visit by clinical staff   Tobacco  Allergies  Meds              Reviewed and updated as needed this visit by Provider                 History reviewed. No pertinent past medical history.   History reviewed. No pertinent surgical history.    Review of  "Systems  CONSTITUTIONAL: NEGATIVE for fever, chills, change in weight  INTEGUMENTARY/SKIN: NEGATIVE for worrisome rashes, moles or lesions  EYES: NEGATIVE for vision changes or irritation  ENT: NEGATIVE for ear, mouth and throat problems  RESP: NEGATIVE for significant cough or SOB  CV: NEGATIVE for chest pain, palpitations or peripheral edema  GI: NEGATIVE for nausea, abdominal pain, heartburn, or change in bowel habits   male: negative for dysuria, hematuria, decreased urinary stream, erectile dysfunction, urethral discharge  MUSCULOSKELETAL:Mild right shoulder discomfort  NEURO: NEGATIVE for weakness, dizziness or paresthesias  PSYCHIATRIC: He has a history of anxiety currently treated with sertraline    OBJECTIVE:   /82 (BP Location: Left arm, Patient Position: Sitting, Cuff Size: Adult Regular)   Pulse 84   Resp 16   Ht 1.77 m (5' 9.69\")   Wt 76.6 kg (168 lb 12.8 oz)   BMI 24.44 kg/m      Physical Exam  GENERAL: healthy, alert and no distress  EYES: Eyes grossly normal to inspection, PERRL and conjunctivae and sclerae normal  HENT: ear canals and TM's normal, nose and mouth without ulcers or lesions  NECK: no adenopathy, no asymmetry, masses, or scars and thyroid normal to palpation  RESP: lungs clear to auscultation - no rales, rhonchi or wheezes  CV: regular rate and rhythm, normal S1 S2, no S3 or S4, no murmur, click or rub, no peripheral edema and peripheral pulses strong  ABDOMEN: soft, nontender, without hepatosplenomegaly or masses   (male): normal male genitalia without lesions or urethral discharge, no hernia  MS: no gross musculoskeletal defects noted, no edema  SKIN: no suspicious lesions or rashes  NEURO: Normal strength and tone, mentation intact and speech normal  PSYCH: mentation appears normal, affect normal/bright    Diagnostic Test Results:  Labs reviewed in Epic    ASSESSMENT/PLAN:   Cristi was seen today for physical.    Diagnoses and all orders for this visit:    Routine " "physical examination    Recommend that he remains physically active    Anxiety state    TERRENCE-7 score is 3.5  Continue sertraline.  He would like to stay on the same dose  A refill was sent  Check a basic metabolic panel  -     sertraline (ZOLOFT) 50 MG tablet; Take 1 tablet (50 mg) by mouth daily  -     Basic metabolic panel; Future    Hyperlipidemia, unspecified hyperlipidemia type    Check lipid cascade  -     Lipid panel reflex to direct LDL Fasting; Future    Ocular rosacea    Continue to follow-up with ophthalmology as needed    Patient has been advised of split billing requirements and indicates understanding: Yes    COUNSELING:   Reviewed preventive health counseling, as reflected in patient instructions       Regular exercise       Healthy diet/nutrition       Alcohol Use        Colorectal cancer screening       Prostate cancer screening    Estimated body mass index is 24.44 kg/m  as calculated from the following:    Height as of this encounter: 1.77 m (5' 9.69\").    Weight as of this encounter: 76.6 kg (168 lb 12.8 oz).         He reports that he has never smoked. He has quit using smokeless tobacco.      Counseling Resources:  ATP IV Guidelines  Pooled Cohorts Equation Calculator  FRAX Risk Assessment  ICSI Preventive Guidelines  Dietary Guidelines for Americans, 2010  [employer]'s MyPlate  ASA Prophylaxis  Lung CA Screening    Seth See MD  [facility]  " W Plasty Text: The lesion was extirpated to the level of the fat with a #15 scalpel blade.  Given the location of the defect, shape of the defect and the proximity to free margins a W-plasty was deemed most appropriate for repair.  Using a sterile surgical marker, the appropriate transposition arms of the W-plasty were drawn incorporating the defect and placing the expected incisions within the relaxed skin tension lines where possible.    The area thus outlined was incised deep to adipose tissue with a #15 scalpel blade.  The skin margins were undermined to an appropriate distance in all directions utilizing iris scissors.  The opposing transposition arms were then transposed into place in opposite direction and anchored with interrupted buried subcutaneous sutures.

## 2023-09-09 DIAGNOSIS — F41.1 ANXIETY STATE: ICD-10-CM

## 2023-09-09 NOTE — TELEPHONE ENCOUNTER
"Routing refill request to provider for review/approval because:  Patient needs to be seen because it has been more than 1 year since last office visit.    Last Written Prescription Date:  6/6/23  Last Fill Quantity: 90,  # refills: 0   Last office visit provider:  2/28/22     Requested Prescriptions   Pending Prescriptions Disp Refills    sertraline (ZOLOFT) 50 MG tablet [Pharmacy Med Name: SERTRALINE HCL 50 MG TABLET] 90 tablet 0     Sig: TAKE ONE TABLET BY MOUTH ONCE DAILY. AN APPOINTMENT IS DUE.       SSRIs Protocol Failed - 9/9/2023 12:31 PM        Failed - Recent (12 mo) or future (30 days) visit within the authorizing provider's specialty     Patient has had an office visit with the authorizing provider or a provider within the authorizing providers department within the previous 12 mos or has a future within next 30 days. See \"Patient Info\" tab in inbasket, or \"Choose Columns\" in Meds & Orders section of the refill encounter.              Passed - Medication is active on med list        Passed - Patient is age 18 or older             Vanessa Ny RN 09/09/23 3:19 PM  "

## 2023-10-06 ENCOUNTER — LAB (OUTPATIENT)
Dept: LAB | Facility: CLINIC | Age: 38
End: 2023-10-06
Attending: INTERNAL MEDICINE
Payer: COMMERCIAL

## 2023-10-06 ENCOUNTER — VIRTUAL VISIT (OUTPATIENT)
Dept: INTERNAL MEDICINE | Facility: CLINIC | Age: 38
End: 2023-10-06
Payer: COMMERCIAL

## 2023-10-06 DIAGNOSIS — U07.1 INFECTION DUE TO 2019 NOVEL CORONAVIRUS: ICD-10-CM

## 2023-10-06 DIAGNOSIS — R50.9 FEVER, UNSPECIFIED FEVER CAUSE: ICD-10-CM

## 2023-10-06 DIAGNOSIS — R50.9 FEVER, UNSPECIFIED FEVER CAUSE: Primary | ICD-10-CM

## 2023-10-06 DIAGNOSIS — J02.9 SORE THROAT: ICD-10-CM

## 2023-10-06 LAB
DEPRECATED S PYO AG THROAT QL EIA: NEGATIVE
GROUP A STREP BY PCR: NOT DETECTED

## 2023-10-06 PROCEDURE — 87651 STREP A DNA AMP PROBE: CPT

## 2023-10-06 PROCEDURE — 99213 OFFICE O/P EST LOW 20 MIN: CPT | Mod: VID | Performed by: INTERNAL MEDICINE

## 2023-10-06 NOTE — PROGRESS NOTES
"Face to face time with patient: 43 minutes (0908---0941)     Cristi is a 38 year old who is being evaluated via a billable video visit.      How would you like to obtain your AVS? MyChart  If the video visit is dropped, the invitation should be resent by: Text to cell phone: [phone]  Will anyone else be joining your video visit? No          Assessment & Plan   (R50.9) Fever, unspecified fever cause  (primary encounter diagnosis)  Comment: Suspect symptoms represented a \"biphasic\" illness from Covid-19 (\"Covid rebound\"). Strep screen negative, no other obvious infectious source identified.   Plan: Streptococcus A Rapid Screen w/Reflex to PCR -       Clinic Collect          (J02.9) Sore throat  Comment: Strep screen negative. Likely related to Covid-19.   Plan: Streptococcus A Rapid Screen w/Reflex to PCR -       Clinic Collect          (U07.1) Infection due to 2019 novel coronavirus  Comment: Suspect a biphasic Covid illness (\"Rebound Covid\").   D[facility] with [facility].  Plan: Discussed with patient on phone.   Repeat Covid-19 home test negative today. Recommended a repeat home test in 48 hours.     Recommend wearing mask at home when near infant daughter (and around mother-in-law, who will be in the home helping with infant daughter).  Attempt to avoid very close intimate contact with infant daughter initially.   (Does not appear to require mask/isolation when around wife and four year old daughter)     If continuing to improve, no fevers while avoiding OTC analgesics/antipyretics tomorrow, AND repeat home Covid-19 test again negative in 48 hours (on 10/8/2023), advised that he may discontinue use of mask/isolation from family members at home.     Luther Kaur MD,   [facility]    Alondra Colin is a 38 year old, presenting for the following health issues:  Covid Concern      10/6/2023    11:36 AM   Additional Questions   Roomed by Pamela VELASQUEZ CMA       History of " "Present Illness       Reason for visit:  Recurring fever and sore throat  Symptom onset:  1-2 weeks ago  Symptoms include:  Chills, cold sweats, fever, sore throat  Symptom intensity:  Moderate  Symptom progression:  Staying the same  Had these symptoms before:  Yes  Has tried/received treatment for these symptoms:  No    He eats 4 or more servings of fruits and vegetables daily.He consumes 1 sweetened beverage(s) daily.He exercises with enough effort to increase his heart rate 30 to 60 minutes per day.  He exercises with enough effort to increase his heart rate 3 or less days per week.   He is taking medications regularly.     Reviewed recent history with patient in great detail.    The patient had initial symptoms of scratchy throat.   The patient, his wife and their daughter all tested positive for Covid-19 with home tests on Tuesday 9/26/2023.   His wife and daughter had minor symptoms which have resolved (his wife received Paxlovid as she was pregnant).     The patient experienced a \"steady fever for three days\", which subsided Friday afternoon, 9/29/2023.  He reports feeling \"100 percent\" from Saturday 9/30 through at least Tuesday, 10/3.     His wife developed pre-eclampsia and was delivery was induced on Tuesday 10/3. Mother and infant doing fine, and they are scheduled to be discharged to home today.     However, the patient felt unwell yesterday while at [facility] with his wife and daughter. His temperature was checked and he believes was measured at 100.5. He was instructed to return home. He developed fevers and sweats and a mild sore throat. He has felt a bit achey yesterday and last night. Early this morning he documented a low grade fever. He believes that he is now feeling better again.     Mild sore throat persists. He has a history of frequent strep pharyngitis through his life. Mild headache, not unusual for him. No neck stiffness. No ear ache or sinus pain. No cough, pleuritic chest " discomfort or dyspnea.No nausea or vomiting, abdominal pain, diarrhea or stool changes. No dysuria or hematuria. No skin rashes, no swollen/red/painful joints.     He wonders about whether further testing or isolation is advised.     Past medical, family and social histories as well as medications reviewed and updated as needed.    Review of Systems   REVIEW OF SYSTEMS: The following systems have been completely reviewed and are negative except as noted above:   Constitutional, HEENT, respiratory, cardiovascular, gastrointestinal, genitourinary, musculoskeletal, dermatologic, psychiatric, and neurologic systems.        Objective           Vitals:  No vitals were obtained today due to virtual visit.    Physical Exam   GENERAL: Healthy, alert and no distress  EYES: Eyes grossly normal to inspection.  No discharge or erythema, or obvious scleral/conjunctival abnormalities.  RESP: No audible wheeze, cough, or visible cyanosis.  No visible retractions or increased work of breathing.    SKIN: Visible skin clear. No significant rash, abnormal pigmentation or lesions.  NEURO: Cranial nerves grossly intact.  Mentation and speech appropriate for age.  PSYCH: Mentation appears normal, affect normal/bright, judgement and insight intact, normal speech and appearance well-groomed.          I was able to speak with one of the ID specialists at the [place] [facility] to discuss his case.     Spoke with patient on phone again in early afternoon.   He has completed a strep screen which was negative.   We reviewed a detailed plan as outlined above.     Video-Visit Details    Type of service:  Video Visit     Originating Location (pt. Location): Home    Distant Location (provider location):  Off-site  Platform used for Video Visit: [email or web address]

## 2023-10-06 NOTE — PROGRESS NOTES
Answers submitted by the patient for this visit:  General Questionnaire (Submitted on 10/6/2023)  Chief Complaint: Chronic problems general questions HPI Form  How many servings of fruits and vegetables do you eat daily?: 4 or more  On average, how many sweetened beverages do you drink each day (Examples: soda, juice, sweet tea, etc.  Do NOT count diet or artificially sweetened beverages)?: 1  How many minutes a day do you exercise enough to make your heart beat faster?: 30 to 60  How many days a week do you exercise enough to make your heart beat faster?: 3 or less  How many days per week do you miss taking your medication?: 0  General Concern (Submitted on 10/6/2023)  Chief Complaint: Chronic problems general questions HPI Form  What is the reason for your visit today?: Recurring fever and sore throat  When did your symptoms begin?: 1-2 weeks ago  What are your symptoms?: Chills, cold sweats, fever, sore throat  How would you describe these symptoms?: Moderate  Are your symptoms:: Staying the same  Have you had these symptoms before?: Yes  Have you tried or received treatment for these symptoms before?: No

## 2023-10-16 DIAGNOSIS — F41.1 ANXIETY STATE: ICD-10-CM

## 2023-10-16 NOTE — TELEPHONE ENCOUNTER
Requesting 90 day refill.     Pending Prescriptions:                       Disp   Refills    sertraline (ZOLOFT) 50 MG tablet          45 tab*0            Sig: TAKE ONE TABLET BY MOUTH ONCE DAILY. AN           APPOINTMENT IS DUE.

## 2023-11-08 ENCOUNTER — OFFICE VISIT (OUTPATIENT)
Dept: FAMILY MEDICINE | Facility: CLINIC | Age: 38
End: 2023-11-08
Payer: COMMERCIAL

## 2023-11-08 VITALS — RESPIRATION RATE: 18 BRPM | WEIGHT: 163.4 LBS | BODY MASS INDEX: 23.39 KG/M2 | HEIGHT: 70 IN

## 2023-11-08 DIAGNOSIS — J02.9 SORE THROAT: ICD-10-CM

## 2023-11-08 DIAGNOSIS — H92.02 LEFT EAR PAIN: Primary | ICD-10-CM

## 2023-11-08 PROBLEM — R14.0 BLOATING: Status: RESOLVED | Noted: 2019-01-14 | Resolved: 2023-11-08

## 2023-11-08 LAB
DEPRECATED S PYO AG THROAT QL EIA: NEGATIVE
GROUP A STREP BY PCR: NOT DETECTED

## 2023-11-08 PROCEDURE — 99213 OFFICE O/P EST LOW 20 MIN: CPT | Performed by: PHYSICIAN ASSISTANT

## 2023-11-08 PROCEDURE — 87651 STREP A DNA AMP PROBE: CPT | Performed by: PHYSICIAN ASSISTANT

## 2023-11-08 ASSESSMENT — PAIN SCALES - GENERAL: PAINLEVEL: MILD PAIN (3)

## 2023-11-08 NOTE — PATIENT INSTRUCTIONS
Strep is negative, awaiting strep culture which should be coming back tomorrow.  Take Afrin nasal spray before the flight or Sudafed to help with ear pain

## 2023-11-08 NOTE — TELEPHONE ENCOUNTER
Questions about home isolation and if needs to retest etc.     Reason for Disposition    [1] COVID-19 diagnosed by positive lab test AND [2] mild symptoms (e.g., cough, fever, others) AND [3] no complications or SOB    COVID-19 Home Isolation, questions about    Protocols used: CORONAVIRUS (COVID-19) DIAGNOSED OR [id number] 12.1.20      
No

## 2023-11-08 NOTE — PROGRESS NOTES
"  Assessment & Plan     Left ear pain  No sign of infection. I suspect eustachian tube dysfunction based on symptoms. Advised decongestant such as Afrin or Sudafed prior to his flight. This usually resolved on its own over the next 1-2 weeks.  Follow up in clinic if symptoms do not improve. Patient agree's with this plan and has no further questions      Sore throat  Strep was negative, awaiting culture. Conservative treatment discussed. Patient agree's with this plan and has no further questions  - Streptococcus A Rapid Screen w/Reflex to PCR - Clinic Collect  - Group A Streptococcus PCR Throat Swab      CATHY Aguilera  [facility]    Alondra Colin is a 38 year old, presenting for the following health issues:  Ear Problem (/)      History of Present Illness       Reason for visit:  Left ear pain  Symptom onset:  1-3 days ago  Symptoms include:  Ear pain made worse by swallowing  Symptom intensity:  Moderate  Symptom progression:  Worsening  Had these symptoms before:  No    He eats 4 or more servings of fruits and vegetables daily.He consumes 1 sweetened beverage(s) daily.He exercises with enough effort to increase his heart rate 30 to 60 minutes per day.  He exercises with enough effort to increase his heart rate 3 or less days per week.   He is taking medications regularly.     Additional provider notes :    Has a sore throat. No fevers. No congestion. Had COVID last month. No chills. Has not done anything at home for symptoms. No one in the household is sick        Review of Systems   Constitutional, HEENT, cardiovascular, pulmonary, gi and gu systems are negative, except as otherwise noted.      Objective    Resp 18   Ht 1.77 m (5' 9.69\")   Wt 74.1 kg (163 lb 6.4 oz)   BMI 23.66 kg/m    Body mass index is 23.66 kg/m .  Physical Exam   GENERAL: healthy, alert and no distress  HENT: normal cephalic/atraumatic, ear canals and TM's normal, nose and mouth without ulcers or " lesions, oropharynx clear, oral mucous membranes moist, tonsillar hypertrophy, and tonsillar erythema  NECK: no adenopathy, no asymmetry, masses, or scars and thyroid normal to palpation  RESP: lungs clear to auscultation - no rales, rhonchi or wheezes  CV: regular rate and rhythm, normal S1 S2, no S3 or S4, no murmur, click or rub, no peripheral edema and peripheral pulses strong  MS: no gross musculoskeletal defects noted, no edema    Results for orders placed or performed in visit on 11/08/23 (from the past 24 hour(s))   Streptococcus A Rapid Screen w/Reflex to PCR - Clinic Collect    Specimen: Throat; Swab   Result Value Ref Range    Group A Strep antigen Negative Negative

## 2023-11-28 ENCOUNTER — PATIENT OUTREACH (OUTPATIENT)
Dept: FAMILY MEDICINE | Facility: CLINIC | Age: 38
End: 2023-11-28
Payer: COMMERCIAL

## 2023-11-28 NOTE — TELEPHONE ENCOUNTER
Patient Quality Outreach    Patient is due for the following:   Physical Preventive Adult Physical      Topic Date Due    Hepatitis B Vaccine (1 of 3 - 3-dose series) Never done    Flu Vaccine (1) 09/01/2023    COVID-19 Vaccine (4 - 2023-24 season) 09/01/2023       Next Steps:   Schedule a Adult Preventative    Type of outreach:    Sent [employer] message.    Next Steps:  Reach out within 90 days via Letter.    Max number of attempts reached:  1/2 . Will try again in 90 days if patient still on fail list.    Questions for provider review:    None           [id number], MEDICAL ASSISTANT

## 2023-12-07 DIAGNOSIS — F41.1 ANXIETY STATE: ICD-10-CM

## 2024-01-01 ASSESSMENT — ENCOUNTER SYMPTOMS
NERVOUS/ANXIOUS: 0
CONSTIPATION: 0
SHORTNESS OF BREATH: 0
PARESTHESIAS: 0
FREQUENCY: 0
CHILLS: 0
DIZZINESS: 0
ARTHRALGIAS: 0
SORE THROAT: 0
WEAKNESS: 0
HEMATOCHEZIA: 0
HEADACHES: 0
HEARTBURN: 0
FEVER: 0
COUGH: 0
ABDOMINAL PAIN: 0
EYE PAIN: 0
HEMATURIA: 0
DIARRHEA: 0
PALPITATIONS: 0
NAUSEA: 0
DYSURIA: 0
MYALGIAS: 0
JOINT SWELLING: 0

## 2024-01-02 ENCOUNTER — OFFICE VISIT (OUTPATIENT)
Dept: FAMILY MEDICINE | Facility: CLINIC | Age: 39
End: 2024-01-02
Payer: COMMERCIAL

## 2024-01-02 VITALS
HEART RATE: 74 BPM | OXYGEN SATURATION: 100 % | WEIGHT: 165.4 LBS | SYSTOLIC BLOOD PRESSURE: 104 MMHG | DIASTOLIC BLOOD PRESSURE: 81 MMHG | HEIGHT: 70 IN | RESPIRATION RATE: 18 BRPM | TEMPERATURE: 98.8 F | BODY MASS INDEX: 23.68 KG/M2

## 2024-01-02 DIAGNOSIS — G89.29 CHRONIC RIGHT SHOULDER PAIN: ICD-10-CM

## 2024-01-02 DIAGNOSIS — E78.5 HYPERLIPIDEMIA, UNSPECIFIED HYPERLIPIDEMIA TYPE: ICD-10-CM

## 2024-01-02 DIAGNOSIS — F41.1 ANXIETY STATE: ICD-10-CM

## 2024-01-02 DIAGNOSIS — M25.511 CHRONIC RIGHT SHOULDER PAIN: ICD-10-CM

## 2024-01-02 DIAGNOSIS — B35.1 DERMATOPHYTOSIS OF NAIL: ICD-10-CM

## 2024-01-02 DIAGNOSIS — Z00.00 ROUTINE GENERAL MEDICAL EXAMINATION AT A HEALTH CARE FACILITY: Primary | ICD-10-CM

## 2024-01-02 DIAGNOSIS — B07.0 PLANTAR WARTS: ICD-10-CM

## 2024-01-02 PROCEDURE — 90686 IIV4 VACC NO PRSV 0.5 ML IM: CPT | Performed by: FAMILY MEDICINE

## 2024-01-02 PROCEDURE — 90471 IMMUNIZATION ADMIN: CPT | Performed by: FAMILY MEDICINE

## 2024-01-02 PROCEDURE — 99395 PREV VISIT EST AGE 18-39: CPT | Mod: 25 | Performed by: FAMILY MEDICINE

## 2024-01-02 PROCEDURE — 99213 OFFICE O/P EST LOW 20 MIN: CPT | Mod: 25 | Performed by: FAMILY MEDICINE

## 2024-01-02 RX ORDER — CICLOPIROX 80 MG/ML
SOLUTION TOPICAL
Qty: 6.6 ML | Refills: 11 | Status: SHIPPED | OUTPATIENT
Start: 2024-01-02

## 2024-01-02 ASSESSMENT — ENCOUNTER SYMPTOMS
FREQUENCY: 0
MYALGIAS: 0
HEADACHES: 0
HEMATOCHEZIA: 0
JOINT SWELLING: 0
DYSURIA: 0
ABDOMINAL PAIN: 0
PALPITATIONS: 0
WEAKNESS: 0
DIARRHEA: 0
PARESTHESIAS: 0
CONSTIPATION: 0
SORE THROAT: 0
SHORTNESS OF BREATH: 0
CHILLS: 0
NERVOUS/ANXIOUS: 0
HEMATURIA: 0
HEARTBURN: 0
ARTHRALGIAS: 0
EYE PAIN: 0
FEVER: 0
DIZZINESS: 0
COUGH: 0
NAUSEA: 0

## 2024-01-02 NOTE — PROGRESS NOTES
SUBJECTIVE:   Cristi is a 38 year old, presenting for the following:  Physical        1/2/2024    12:47 PM   Additional Questions   Roomed by Sarahi OLIVAERZ CMA     Cristi is a pleasant 38-year-old male who presents to the clinic for a preventive health visit.  He presents for medication check as well.  He was last seen in the clinic almost 2 years ago.    His medical history is notable for hyperlipidemia as well as anxiety.    His anxiety has generally been stable.  He has been taking sertraline 50 mg a day for several years and this has been quite effective for him.  He has no strong desire to wean.  He has been tolerating the medication without difficulty.    He does note that he can experience some right shoulder pain.  This is worse when elevating his right arm.    He states he had a COVID infection approximately 90 days ago.  Symptoms have completely resolved.    He does have thickening of multiple toenails consistent with onychomycosis.  He would be interested in a topical treatment.  Additionally, he has a wart on the right fifth toe.     In the past he has followed up with ophthalmology and has been diagnosed with ocular rosacea which has not been significantly bothersome recently. He was treated with doxycycline for a period of time but actually has not been taking that consistently     In the past he did have an evaluation for symptoms including abdominal bloating and cramping.  He had some changes in stool consistency with loose bowel movements.  He did have an evaluation with negative stool cultures and likely has irritable bowel syndrome.  He did follow-up with gastroenterology.     He continues to work for [employer] within [employer].  He has 2 young children.      Family history is notable for a grandfather with colon cancer that was diagnosed in his 50s.    Healthy Habits:     Getting at least 3 servings of Calcium per day:  Yes    Bi-annual eye exam:  Yes    Dental care twice a year:  NO    Sleep apnea or symptoms of  "sleep apnea:  None    Diet:  Regular (no restrictions)    Frequency of exercise:  2-3 days/week    Duration of exercise:  30-45 minutes    Taking medications regularly:  Yes    Medication side effects:  None    Additional concerns today:  No            Social History     Tobacco Use    Smoking status: Never     Passive exposure: Never    Smokeless tobacco: Former   Substance Use Topics    Alcohol use: Yes             1/1/2024     1:54 PM   Alcohol Use   Prescreen: >3 drinks/day or >7 drinks/week? No          No data to display                Last PSA: No results found for: \"PSA\"    Reviewed orders with patient. Reviewed health maintenance and updated orders accordingly - Yes  Patient Active Problem List   Diagnosis    Onychomycosis    Anxiety    Hyperlipidemia    Orgasmic Cephalgia     Past Surgical History:   Procedure Laterality Date    MYRINGOTOMY, INSERT TUBE BILATERAL, COMBINED         Social History     Tobacco Use    Smoking status: Never     Passive exposure: Never    Smokeless tobacco: Former   Substance Use Topics    Alcohol use: Yes     Family History   Problem Relation Age of Onset    Anxiety Disorder Mother     Alzheimer Disease Father         early onset at 56    Hyperlipidemia Father     Dementia Father     Alzheimer Disease Maternal Grandmother     Dementia Maternal Grandmother     Lung Cancer Maternal Grandfather     Alzheimer Disease Paternal Grandmother     Dementia Paternal Grandmother     Colon Cancer Paternal Grandfather     Anxiety Disorder Sister          Current Outpatient Medications   Medication Sig Dispense Refill    ciclopirox (PENLAC) 8 % external solution Apply to adjacent skin and affected nails daily.  Remove with alcohol every 7 days, then repeat. 6.6 mL 11    sertraline (ZOLOFT) 50 MG tablet TAKE ONE TABLET BY MOUTH ONCE DAILY 90 tablet 4    EPINEPHrine (EPIPEN JR) 0.15 MG/0.3ML injection 2-pack Inject 0.15 mg into the muscle as needed for anaphylaxis       Allergies   Allergen " "Reactions    Bees Swelling    Other Environmental Allergy      BEES       Reviewed and updated as needed this visit by clinical staff   Tobacco  Allergies  Meds              Reviewed and updated as needed this visit by Provider                 History reviewed. No pertinent past medical history.   Past Surgical History:   Procedure Laterality Date    MYRINGOTOMY, INSERT TUBE BILATERAL, COMBINED         Review of Systems   Constitutional:  Negative for chills and fever.   HENT:  Negative for congestion, ear pain, hearing loss and sore throat.    Eyes:  Negative for pain and visual disturbance.   Respiratory:  Negative for cough and shortness of breath.    Cardiovascular:  Negative for chest pain, palpitations and peripheral edema.   Gastrointestinal:  Negative for abdominal pain, constipation, diarrhea, heartburn, hematochezia and nausea.   Genitourinary:  Negative for dysuria, frequency, genital sores, hematuria, impotence, penile discharge and urgency.   Musculoskeletal:  Negative for arthralgias, joint swelling and myalgias.   Skin:  Negative for rash.   Neurological:  Negative for dizziness, weakness, headaches and paresthesias.   Psychiatric/Behavioral:  Negative for mood changes. The patient is not nervous/anxious.          OBJECTIVE:   /81 (BP Location: Left arm, Patient Position: Sitting, Cuff Size: Adult Regular)   Pulse 74   Temp 98.8  F (37.1  C) (Oral)   Resp 18   Ht 1.77 m (5' 9.69\")   Wt 75 kg (165 lb 6.4 oz)   SpO2 100%   BMI 23.94 kg/m      Physical Exam  GENERAL: healthy, alert and no distress  EYES: Eyes grossly normal to inspection, PERRL and conjunctivae and sclerae normal  HENT: ear canals and TM's normal, nose and mouth without ulcers or lesions  NECK: no adenopathy, no asymmetry, masses, or scars and thyroid normal to palpation  RESP: lungs clear to auscultation - no rales, rhonchi or wheezes  CV: regular rate and rhythm, normal S1 S2, no S3 or S4, no murmur, click or rub, no " peripheral edema and peripheral pulses strong  ABDOMEN: soft, nontender   Genitourinary: Penis is circumcised, no scrotal masses, no inguinal hernia  MS: There is mild discomfort with right arm abduction  There are no obvious impingement signs  SKIN: Examination of the right foot reveals a wart involving the right fifth toe  There is thickening discoloration of multiple toenails including the first, third, and fourth toes of the right foot  NEURO: Normal strength and tone, mentation intact and speech normal  PSYCH: mentation appears normal, affect normal/bright    Diagnostic Test Results:  Labs reviewed in Epic    ASSESSMENT/PLAN:   Cristi was seen today for physical.    Diagnoses and all orders for this visit:    Routine general medical examination at a health care facility    He will follow-up for fasting laboratory he will follow-up for a fasting laboratory check in the future    -     HIV Antigen Antibody Combo; Future  -     Hepatitis C Screen Reflex to HCV RNA Quant and Genotype; Future    Influenza vaccine given today  He will consider the COVID-vaccine    Given family history of colon cancer in a grandfather recommend doing a colonoscopy by the age of 40    Anxiety state    Currently stable  His preference is to continue sertraline 50 mg daily.  A refill was sent for him  He will consider weaning in the future  Follow-up to check a basic metabolic panel and hemogram with platelets    -     sertraline (ZOLOFT) 50 MG tablet; TAKE ONE TABLET BY MOUTH ONCE DAILY  -     Basic metabolic panel; Future  -     CBC with platelets; Future    Chronic right shoulder pain    May be secondary to rotator cuff tendinitis  Discussed consideration for physical therapy if having ongoing symptoms  He may take anti-inflammatory medication  Recommend avoiding activities that may aggravate his pain    Plantar wart  Right fifth toe    He may use over-the-counter Compound W or dual foam  Consider follow-up for treatment liquid  nitrogen in the future    Hyperlipidemia, unspecified hyperlipidemia type    Follow-up for lipid cascade in the future when fasting    -     Lipid panel reflex to direct LDL Fasting; Future    Onychomycosis    He will start with topical Penlac  Discussed he could be considered for oral medications though reviewed potential side effects    -     Hepatic function panel; Future  -     ciclopirox (PENLAC) 8 % external solution; Apply to adjacent skin and affected nails daily.  Remove with alcohol every 7 days, then repeat.    Other orders  -     REVIEW OF HEALTH MAINTENANCE PROTOCOL ORDERS  -     INFLUENZA VACCINE >6 MONTHS (AFLURIA/FLUZONE)        Patient has been advised of split billing requirements and indicates understanding: Yes      COUNSELING:   Reviewed preventive health counseling, as reflected in patient instructions        He reports that he has never smoked. He has never been exposed to tobacco smoke. He has quit using smokeless tobacco.            Seth See MD  [facility]

## 2024-01-02 NOTE — PATIENT INSTRUCTIONS
Cristi,    You may set up a fasting laboratory appointment at your convenience  You can continue sertraline as prescribed.  In the future you can consider weaning this medication.  You could decrease to 25 mg and see how you do  For your wart you may apply topical Compound W/Duofilm  Apply that daily then soak your foot.  You may use a file once weekly to see if that is helpful  For the fungal infection of the toenails I sent a prescription for topical Penlac  There are oral medications that can be prescribed though he likely would need to take those over 3-4 months  Please let me know if you need an EpiPen in the future  You received a flu shot today  You can consider the COVID-19 vaccine in the future    Seth See MD          Preventive Health Recommendations  Male Ages 26 - 39    Yearly exam:             See your health care provider every year in order to  o   Review health changes.   o   Discuss preventive care.    o   Review your medicines if your doctor has prescribed any.  You should be tested each year for STDs (sexually transmitted diseases), if you re at risk.   After age 35, talk to your provider about cholesterol testing. If you are at risk for heart disease, have your cholesterol tested at least every 5 years.   If you are at risk for diabetes, you should have a diabetes test (fasting glucose).  Shots: Get a flu shot each year. Get a tetanus shot every 10 years.     Nutrition:  Eat at least 5 servings of fruits and vegetables daily.   Eat whole-grain bread, whole-wheat pasta and brown rice instead of white grains and rice.   Get adequate Calcium and Vitamin D.     Lifestyle  Exercise for at least 150 minutes a week (30 minutes a day, 5 days a week). This will help you control your weight and prevent disease.   Limit alcohol to one drink per day.   No smoking.   Wear sunscreen to prevent skin cancer.   See your dentist every six months for an exam and cleaning.

## 2024-06-11 ENCOUNTER — OFFICE VISIT (OUTPATIENT)
Dept: FAMILY MEDICINE | Facility: CLINIC | Age: 39
End: 2024-06-11
Payer: COMMERCIAL

## 2024-06-11 VITALS
OXYGEN SATURATION: 100 % | RESPIRATION RATE: 18 BRPM | BODY MASS INDEX: 22.16 KG/M2 | HEART RATE: 64 BPM | SYSTOLIC BLOOD PRESSURE: 115 MMHG | HEIGHT: 70 IN | DIASTOLIC BLOOD PRESSURE: 93 MMHG | WEIGHT: 154.8 LBS | TEMPERATURE: 98.5 F

## 2024-06-11 DIAGNOSIS — F41.9 ANXIETY: Primary | ICD-10-CM

## 2024-06-11 DIAGNOSIS — Z56.6 WORK STRESS: ICD-10-CM

## 2024-06-11 PROCEDURE — 99214 OFFICE O/P EST MOD 30 MIN: CPT | Performed by: FAMILY MEDICINE

## 2024-06-11 SDOH — HEALTH STABILITY - MENTAL HEALTH: OTHER PHYSICAL AND MENTAL STRAIN RELATED TO WORK: Z56.6

## 2024-06-11 ASSESSMENT — ANXIETY QUESTIONNAIRES
GAD7 TOTAL SCORE: 13
7. FEELING AFRAID AS IF SOMETHING AWFUL MIGHT HAPPEN: SEVERAL DAYS
5. BEING SO RESTLESS THAT IT IS HARD TO SIT STILL: SEVERAL DAYS
IF YOU CHECKED OFF ANY PROBLEMS ON THIS QUESTIONNAIRE, HOW DIFFICULT HAVE THESE PROBLEMS MADE IT FOR YOU TO DO YOUR WORK, TAKE CARE OF THINGS AT HOME, OR GET ALONG WITH OTHER PEOPLE: EXTREMELY DIFFICULT
3. WORRYING TOO MUCH ABOUT DIFFERENT THINGS: NEARLY EVERY DAY
6. BECOMING EASILY ANNOYED OR IRRITABLE: MORE THAN HALF THE DAYS
8. IF YOU CHECKED OFF ANY PROBLEMS, HOW DIFFICULT HAVE THESE MADE IT FOR YOU TO DO YOUR WORK, TAKE CARE OF THINGS AT HOME, OR GET ALONG WITH OTHER PEOPLE?: EXTREMELY DIFFICULT
2. NOT BEING ABLE TO STOP OR CONTROL WORRYING: MORE THAN HALF THE DAYS
7. FEELING AFRAID AS IF SOMETHING AWFUL MIGHT HAPPEN: SEVERAL DAYS
4. TROUBLE RELAXING: SEVERAL DAYS
GAD7 TOTAL SCORE: 13
1. FEELING NERVOUS, ANXIOUS, OR ON EDGE: NEARLY EVERY DAY
GAD7 TOTAL SCORE: 13

## 2024-06-11 ASSESSMENT — ENCOUNTER SYMPTOMS: NERVOUS/ANXIOUS: 1

## 2024-06-11 NOTE — PROGRESS NOTES
Assessment & Plan     Anxiety  Work stress    Reviewed his recent history  He has had significant anxiety and stress related primarily to his work environment  He has discussed concerns with his boss  Reviewed options  His preference is to take a leave from work.  He will check with his human resources and provide an update and will likely take a continuous period of time away  Discussed other supportive options including follow-up with a mental health therapist or can consider adjusting sertraline  He is contemplating changing jobs    .Spent 30 minutes including time for chart preparation and review as well as time with Ta and in reviewing the plan  Paperwork will be completed when available                Subjective   Cristi is a 39 year old, presenting for the following health issues:  Anxiety        6/11/2024    11:15 AM   Additional Questions   Roomed by Sarahi OLIVAREZ CMA     Anxiety    History of Present Illness       Mental Health Follow-up:  Patient presents to follow-up on Anxiety.    Patient's anxiety since last visit has been:  Bad  The patient is having other symptoms associated with anxiety.  Any significant life events: No  Patient is feeling anxious or having panic attacks.  Patient has no concerns about alcohol or drug use.    He eats 4 or more servings of fruits and vegetables daily.He consumes 1 sweetened beverage(s) daily.He exercises with enough effort to increase his heart rate 30 to 60 minutes per day.  He exercises with enough effort to increase his heart rate 3 or less days per week.   He is taking medications regularly.     Cristi is a pleasant 39-year-old male who presents to the clinic to review an increase in anxiety as well as job stress.  He was seen for a physical examination in January of this year.  He has a longstanding history of anxiety and has been taking sertraline 50 mg.  He states that the work stress has worsened significantly lately.  His boss left and he is reporting to another  "boss that has been more difficult.  He realizes he may not like his job and there has been a tremendous amount of stress.  He has been feeling increasingly anxious.  He has considered options including quitting his job but was advised to consider taking a leave which is what he would like to do.  He is doing well financially and could discontinue work but will think this through.  For now he would like to stay on his current dose of sertraline.  We discussed having him follow-up with a therapist as needed. He will consider his options.               Objective    BP (!) 115/93 (BP Location: Left arm, Patient Position: Sitting, Cuff Size: Adult Regular)   Pulse 64   Temp 98.5  F (36.9  C) (Oral)   Resp 18   Ht 1.77 m (5' 9.69\")   Wt 70.2 kg (154 lb 12.8 oz)   SpO2 100%   BMI 22.41 kg/m    Body mass index is 22.41 kg/m .  Physical Exam   GENERAL: alert and no distress  PSYCH: mentation appears normal, affect normal/bright            Signed Electronically by: Seth See MD    "

## 2024-06-14 ENCOUNTER — TELEPHONE (OUTPATIENT)
Dept: FAMILY MEDICINE | Facility: CLINIC | Age: 39
End: 2024-06-14
Payer: COMMERCIAL

## 2024-06-14 PROBLEM — Z56.6 WORK STRESS: Status: ACTIVE | Noted: 2024-06-14

## 2024-06-14 NOTE — TELEPHONE ENCOUNTER
Forms/Letter Request    Type of form/letter: Disability      Do we have the form/letter: Yes: Disability    Who is the form from? [employer], employer (if other please explain)    Where did/will the form come from? Patient or family brought in       When is form/letter needed by: Next week    How would you like the form/letter returned:     Patient Notified form requests are processed in 5-7 business days:Yes    Could we send this information to you in [employer] or would you prefer to receive a phone call?:   Patient would prefer a phone call   Okay to leave a detailed message?: Yes at Other phone number: [phone] pt when done and he will , put in today's mail for [facility]'s care team today

## 2024-09-16 ENCOUNTER — ANCILLARY PROCEDURE (OUTPATIENT)
Dept: GENERAL RADIOLOGY | Facility: CLINIC | Age: 39
End: 2024-09-16
Attending: PHYSICIAN ASSISTANT
Payer: COMMERCIAL

## 2024-09-16 ENCOUNTER — OFFICE VISIT (OUTPATIENT)
Dept: FAMILY MEDICINE | Facility: CLINIC | Age: 39
End: 2024-09-16
Payer: COMMERCIAL

## 2024-09-16 VITALS
RESPIRATION RATE: 18 BRPM | BODY MASS INDEX: 23.45 KG/M2 | SYSTOLIC BLOOD PRESSURE: 122 MMHG | OXYGEN SATURATION: 100 % | WEIGHT: 163.8 LBS | HEIGHT: 70 IN | TEMPERATURE: 97.8 F | HEART RATE: 76 BPM | DIASTOLIC BLOOD PRESSURE: 82 MMHG

## 2024-09-16 DIAGNOSIS — M25.571 PAIN IN JOINT INVOLVING ANKLE AND FOOT, RIGHT: Primary | ICD-10-CM

## 2024-09-16 DIAGNOSIS — M25.571 PAIN IN JOINT INVOLVING ANKLE AND FOOT, RIGHT: ICD-10-CM

## 2024-09-16 PROCEDURE — 73610 X-RAY EXAM OF ANKLE: CPT | Mod: TC | Performed by: RADIOLOGY

## 2024-09-16 PROCEDURE — G2211 COMPLEX E/M VISIT ADD ON: HCPCS | Performed by: PHYSICIAN ASSISTANT

## 2024-09-16 PROCEDURE — 99213 OFFICE O/P EST LOW 20 MIN: CPT | Performed by: PHYSICIAN ASSISTANT

## 2024-09-16 ASSESSMENT — PAIN SCALES - GENERAL: PAINLEVEL: MILD PAIN (3)

## 2024-09-16 NOTE — PATIENT INSTRUCTIONS
Linnea Chappell,    Thank you for allowing [facility] to manage your care.    I am unsure of the cause of your symptoms, but your exam and xray are reassuring. This is likely a bad ankle sprain. Use the crutches as needed.    If you develop worsening/changing symptoms at any time, please be seen in clinic/urgent care.    For your pain, please use Tylenol 1000mg every 6 hours. You may use 600mg of ibuprofen between doses of Tylenol.     Max acetaminophen (Tylenol) 4,000mg/24 hours  Max ibuprofen 3,000mg/24 hours    If you have any questions or concerns, please feel free to call us at [phone]    Sincerely,    John Burk PA-C    Did you know?      You can schedule a video visit for follow-up appointments as well as future appointments for certain conditions.  Please see the below link.     [email or web address]    If you have not already done so,  I encourage you to sign up for [employer] ([email or web address]).  This will allow you to review your results, securely communicate with a provider, and schedule virtual visits as well.

## 2024-09-16 NOTE — PROGRESS NOTES
Assessment & Plan   Problem List Items Addressed This Visit    None  Visit Diagnoses       Pain in joint involving ankle and foot, right    -  Primary    Relevant Orders    XR Ankle Right G/E 3 Views (Completed)            Cristi Ortega is a 39 year old male with no significant PMH who now presents c/o right ankle pain status post injury (mechanical). DDx sprain/strain/fracture/contusion/dislocation/others. XR is negative for fracture. Impression is sprain. Will tx with analgesics otc, RICE/heat, and follow up with primary, myself or ortho urgent care if not improving in the next 1-2 weeks for re-evaluation.     Complete history and physical exam as below. Afebrile with normal vital signs.    DDx and Dx discussed with and explained to the pt to their satisfaction.  All questions were answered at this time. Pt expressed understanding of and agreement with this dx, tx, and plan. No further workup warranted and standard medication warnings given.Will go to the Emergency Department if symptoms worsen or new concerning symptoms arise. Patient left in no apparent distress.      See Patient Instructions      Alondra Chappell is a 39 year old, presenting for the following health issues:  Injury        9/16/2024    10:01 AM   Additional Questions   Roomed by Holly Heath CMA   Accompanied by N/A         9/16/2024    10:01 AM   Patient Reported Additional Medications   Patient reports taking the following new medications No new medications     History of Present Illness       Reason for visit:  Ankle injury  Symptom onset:  3-7 days ago  Symptoms include:  Swelling, bruising, throbbing pain  Symptom intensity:  Moderate  Symptom progression:  Staying the same  Had these symptoms before:  No  What makes it worse:  Putting weight on ankle  What makes it better:  Elevation   He is taking medications regularly.     Patient is coming in today with an injured right ankle, that happened due to tripping.  Incident occurred 5  "days ago, no hit on head noted.  Patient did not hear any snaps, crackles, or pops.      Review of Systems  Constitutional, msk, cardiovascular, pulmonary, gi and gu systems are negative, except as otherwise noted.      Objective    /82   Pulse 76   Temp 97.8  F (36.6  C) (Temporal)   Resp 18   Ht 1.77 m (5' 9.69\")   Wt 74.3 kg (163 lb 12.8 oz)   SpO2 100%   BMI 23.71 kg/m    Body mass index is 23.71 kg/m .  Physical Exam  Vitals and nursing note reviewed.   Constitutional:       General: He is not in acute distress.     Appearance: Normal appearance. He is not diaphoretic.   HENT:      Head: Normocephalic and atraumatic.      Nose: Nose normal.   Eyes:      Conjunctiva/sclera: Conjunctivae normal.   Pulmonary:      Effort: Pulmonary effort is normal. No respiratory distress.   Musculoskeletal:      Comments: RLE: tenderness and edema along the lateral malleolus. Distal CMS intact. Remainder of limb non-tender including the foot bones.  No overlying signs of trauma or infection.    Skin:     General: Skin is dry.      Coloration: Skin is not jaundiced or pale.   Neurological:      General: No focal deficit present.      Mental Status: He is alert. Mental status is at baseline.   Psychiatric:         Mood and Affect: Mood normal.         Behavior: Behavior normal.          Results for orders placed or performed in visit on 09/16/24   XR Ankle Right G/E 3 Views     Status: None    Narrative    XR ANKLE RIGHT G/E 3 VIEWS   9/16/2024 10:26 AM     HISTORY: Pain in joint involving ankle and foot, right  COMPARISON: None.       Impression    IMPRESSION:     Negative for acute right ankle fracture or dislocation. Normal joint  spacing. There is soft tissue swelling over the ankle more pronounced  laterally and dorsally. A very small ossicle adjacent to the medial  malleolus has a more chronic appearance and is favored to related to  remote injury.    SIGRID RAMOS MD         SYSTEM ID:  [id number]       "     Signed Electronically by: CATHY Joe

## 2024-12-03 ENCOUNTER — PATIENT OUTREACH (OUTPATIENT)
Dept: CARE COORDINATION | Facility: CLINIC | Age: 39
End: 2024-12-03
Payer: COMMERCIAL

## 2025-01-04 DIAGNOSIS — F41.1 ANXIETY STATE: ICD-10-CM

## 2025-02-11 ENCOUNTER — TELEPHONE (OUTPATIENT)
Dept: FAMILY MEDICINE | Facility: CLINIC | Age: 40
End: 2025-02-11
Payer: COMMERCIAL

## 2025-02-11 NOTE — TELEPHONE ENCOUNTER
"Incoming call from Jacqueline from [facility] [employer], requesting to speak with Dr. See for 5 minutes about the patient's health issues to determine if he qualifies for disability. I called the number they provided, and another representative, Donna, answered. When asked the name of the company, she said \"[employer],\" which is different from the name I was originally given. I tried to verify this with the patient, but he didn't answer.  If the patient calls, please confirm if he requested disability through a third-party company, as he has [employer]. I'm unsure if this is a scam.  "

## 2025-02-15 ENCOUNTER — HEALTH MAINTENANCE LETTER (OUTPATIENT)
Age: 40
End: 2025-02-15

## 2025-02-17 NOTE — TELEPHONE ENCOUNTER
See [facility] message. Patient states he filed a work disability claim last summer. KEZIA sent to patient to complete.

## 2025-02-17 NOTE — TELEPHONE ENCOUNTER
I am not going to speak to him if there is uncertainty about who I am speaking to and in what capacity. If there are concerns they will reviewed with the patient at an appointment. Appropriate paperwork can be reviewed if required for the patient at that time.

## 2025-07-31 DIAGNOSIS — F41.1 ANXIETY STATE: ICD-10-CM
